# Patient Record
Sex: FEMALE | Race: BLACK OR AFRICAN AMERICAN | NOT HISPANIC OR LATINO | Employment: OTHER | URBAN - METROPOLITAN AREA
[De-identification: names, ages, dates, MRNs, and addresses within clinical notes are randomized per-mention and may not be internally consistent; named-entity substitution may affect disease eponyms.]

---

## 2017-04-20 ENCOUNTER — HOSPITAL ENCOUNTER (OUTPATIENT)
Dept: SLEEP MEDICINE | Facility: HOSPITAL | Age: 57
Discharge: HOME | End: 2017-04-20
Attending: INTERNAL MEDICINE
Payer: MEDICARE

## 2017-04-20 PROCEDURE — 95810 POLYSOM 6/> YRS 4/> PARAM: CPT

## 2017-07-20 ENCOUNTER — HOSPITAL ENCOUNTER (OUTPATIENT)
Dept: SLEEP MEDICINE | Facility: HOSPITAL | Age: 57
Discharge: HOME | End: 2017-07-20
Attending: INTERNAL MEDICINE
Payer: MEDICARE

## 2017-07-20 DIAGNOSIS — G47.33 OBSTRUCTIVE SLEEP APNEA (ADULT) (PEDIATRIC): ICD-10-CM

## 2017-07-20 PROCEDURE — 95811 POLYSOM 6/>YRS CPAP 4/> PARM: CPT

## 2018-06-21 PROBLEM — G47.33 OSA ON CPAP: Status: ACTIVE | Noted: 2018-06-21

## 2018-06-21 PROBLEM — I50.30 (HFPEF) HEART FAILURE WITH PRESERVED EJECTION FRACTION: Status: ACTIVE | Noted: 2018-06-21

## 2018-06-21 PROBLEM — M21.371 BILATERAL FOOT-DROP: Status: ACTIVE | Noted: 2018-06-21

## 2018-06-21 PROBLEM — E11.9 TYPE 2 DIABETES MELLITUS WITHOUT COMPLICATION, WITHOUT LONG-TERM CURRENT USE OF INSULIN: Status: ACTIVE | Noted: 2018-06-21

## 2018-06-21 PROBLEM — E66.01 MORBID OBESITY: Status: ACTIVE | Noted: 2018-06-21

## 2018-06-21 PROBLEM — I10 ESSENTIAL HYPERTENSION: Status: ACTIVE | Noted: 2018-06-21

## 2018-06-21 PROBLEM — R53.81 PHYSICAL DECONDITIONING: Status: ACTIVE | Noted: 2018-06-21

## 2018-06-21 PROBLEM — M21.372 BILATERAL FOOT-DROP: Status: ACTIVE | Noted: 2018-06-21

## 2018-12-28 PROBLEM — R06.09 DYSPNEA ON EXERTION: Status: ACTIVE | Noted: 2018-12-28

## 2018-12-30 PROBLEM — I27.20 PULMONARY HYPERTENSION: Status: ACTIVE | Noted: 2018-12-30

## 2019-02-25 ENCOUNTER — OFFICE VISIT (OUTPATIENT)
Dept: URGENT CARE | Facility: CLINIC | Age: 59
End: 2019-02-25
Payer: MEDICARE

## 2019-02-25 VITALS
OXYGEN SATURATION: 98 % | DIASTOLIC BLOOD PRESSURE: 70 MMHG | HEIGHT: 64 IN | SYSTOLIC BLOOD PRESSURE: 157 MMHG | BODY MASS INDEX: 43.36 KG/M2 | TEMPERATURE: 99 F | WEIGHT: 254 LBS | HEART RATE: 102 BPM | RESPIRATION RATE: 20 BRPM

## 2019-02-25 DIAGNOSIS — S81.812A LACERATION OF LEFT LOWER EXTREMITY, INITIAL ENCOUNTER: Primary | ICD-10-CM

## 2019-02-25 PROCEDURE — 99203 PR OFFICE/OUTPT VISIT, NEW, LEVL III, 30-44 MIN: ICD-10-PCS | Mod: S$GLB,,, | Performed by: FAMILY MEDICINE

## 2019-02-25 PROCEDURE — 99203 OFFICE O/P NEW LOW 30 MIN: CPT | Mod: S$GLB,,, | Performed by: FAMILY MEDICINE

## 2019-02-25 RX ORDER — CEPHALEXIN 250 MG/1
250 CAPSULE ORAL 4 TIMES DAILY
Qty: 40 CAPSULE | Refills: 0 | Status: SHIPPED | OUTPATIENT
Start: 2019-02-25 | End: 2019-03-07

## 2019-02-25 RX ORDER — MUPIROCIN 20 MG/G
OINTMENT TOPICAL 2 TIMES DAILY
Qty: 30 G | Refills: 0 | Status: SHIPPED | OUTPATIENT
Start: 2019-02-25 | End: 2019-07-11

## 2019-02-25 NOTE — PATIENT INSTRUCTIONS
Please drink plenty of fluids.  Please get plenty of rest.  Please return here or go to the Emergency Department for any concerns or worsening of condition.  If you were prescribed antibiotics, please take them to completion.  If you were prescribed a narcotic medication, do not drive or operate heavy equipment or machinery while taking these medications.      If not allergic, please take over the counter Tylenol (Acetaminophen) and/or Motrin (Ibuprofen) as directed for control of pain and/or fever.    Your tetanus was brought up to date if needed.    Keep wound clean and dry.  You may use a plastic bag to keep area dry while showering    Clean wound once or twice a day with soap and water, then apply antibiotic ointment and redress wound.        Please follow up with your primary care doctor or specialist as needed.  Preston Montesinos MD  [phone]      Laceration: All Closures  A laceration is a cut through the skin. This will usually require stitches (sutures) or staples if it is deep. Minor cuts may be treated with a surgical tape closure or skin glue.    Home care  · Your healthcare provider may prescribe an antibiotic. This is to help prevent infection. Follow all instructions for taking this medicine. Take the medicine every day until it is gone or you are told to stop. You should not have any left over.  · The healthcare provider may prescribe medicines for pain. Follow instructions for taking them.  · Follow the healthcare providers instructions on how to care for the cut.  · Keep the wound clean and dry. Do not get the wound wet until you are told it is okay to do so. If the area gets wet, gently pat it dry with a clean cloth. Replace the wet bandage with a dry one.  · If a bandage was applied and it becomes wet or dirty, replace it. Otherwise, leave it in place for the first 24 hours.  · Caring for sutures or staples: Once you no longer need to keep them dry, clean the wound daily. First, remove the  bandage. Then wash the area gently with soap and warm water, or as directed by the health care provider. Use a wet cotton swab to loosen and remove any blood or crust that forms. After cleaning, apply a thin layer of antibiotic ointment if advised. Then put on a new bandage unless you are told not to.  · Caring for skin glue: Dont put apply liquid, ointment, or cream on the wound while the glue is in place. Avoid activities that cause heavy sweating. Protect the wound from sunlight. Do not scratch, rub, or pick at the adhesive film. Do not place tape directly over the film. The glue should peel off within 5 to 10 days.   · Caring for surgical tape: Keep the area dry. If it gets wet, blot it dry with a clean towel. Surgical tape usually falls off within 7 to 10 days. If it has not fallen off after 10 days, you can take it off yourself. Put mineral oil or petroleum jelly on a cotton ball and gently rub the tape until it is removed.  · Once you can get the wound wet, you may shower as usual but do not soak the wound in water (no tub baths or swimming)  · Even with proper treatment, a wound infection may sometimes occur. Check the wound daily for signs of infection listed below.  Scalp wounds  During the first two days, you may carefully rinse your hair in the shower to remove blood, glass or dirt particles. After two days, you may shower and shampoo your hair normally. Do not soak your scalp in the tub or go swimming until the stitches or staples have been removed. Talk with your healthcare provider before applying any antibiotic ointment to the wound.  Mouth wounds  Eat soft foods to reduce pain. If the cut is inside of your mouth, clean by rinsing after each meal and at bedtime with a mixture of equal parts water and hydrogen peroxide (do not swallow!). Or, you can use a cotton swab to directly apply hydrogen peroxide onto the cut. Mouth wounds can be painful when eating. You may use an over-the-counter local  numbing solution for pain relief. If this is not available, you may use any numbing solution intended for teething babies. You may apply this directly to the sores with a cotton-tip swab or with your finger.  Follow-up care  Follow up with your healthcare provider as advised. Ask your healthcare provider how long sutures should be left in place. Be sure to return for suture removal as directed. If dissolving stitches were used in the mouth, these should fall out or dissolve without the need for removal. If tape closures were used, remove them yourself when your provider recommends if they have not fallen off on their own. If skin glue was used, the film will wear off by itself.  When to seek medical advice  Call your healthcare provider right away if any of these occur:  · Wound bleeding not controlled by direct pressure  · Signs of infection, including increasing pain in the wound, increasing wound redness or swelling, or pus or bad odor coming from the wound  · Fever of 100.4°F (38.ºC) or higher or as directed by your healthcare provider  · Stitches or staples come apart or fall out or surgical tape falls off before 7 days  · Wound edges re-open  · Wound changes colors  · Numbness around the wound   · Decreased movement around the injured area  Date Last Reviewed: 6/14/2015  © 9394-1908 [employer]. [address] [place] [address], [place] [address]. All rights reserved. This information is not intended as a substitute for professional medical care. Always follow your healthcare professional's instructions.

## 2019-02-25 NOTE — PROGRESS NOTES
"Subjective:       Patient ID: Veronica Samuel is a 59 y.o. female.    Vitals:  height is 5' 4" (1.626 m) and weight is 115.2 kg (254 lb). Her oral temperature is 98.5 °F (36.9 °C). Her blood pressure is 157/70 (abnormal) and her pulse is 102. Her respiration is 20 and oxygen saturation is 98%.     Chief Complaint: Laceration    Laceration    The incident occurred 2 days ago. The laceration is located on the left leg. The laceration is 2 cm in size. The laceration mechanism was a blunt object and metal edge. The patient is experiencing no pain. It is unknown if a foreign body is present. Her tetanus status is out of date.       Constitution: Negative for fatigue.   HENT: Negative for facial swelling and facial trauma.    Neck: Negative for neck stiffness.   Cardiovascular: Negative for chest trauma.   Eyes: Negative for eye trauma, double vision and blurred vision.   Gastrointestinal: Negative for abdominal trauma, abdominal pain and rectal bleeding.   Genitourinary: Negative for hematuria, missed menses, genital trauma and pelvic pain.   Musculoskeletal: Negative for pain, trauma, joint swelling and abnormal ROM of joint.   Skin: Positive for laceration. Negative for color change, wound, abrasion and bruising.   Neurological: Negative for dizziness, history of vertigo, light-headedness, coordination disturbances, altered mental status and loss of consciousness.   Hematologic/Lymphatic: Negative for history of bleeding disorder.   Psychiatric/Behavioral: Negative for altered mental status.       Objective:      Physical Exam   Constitutional: She is oriented to person, place, and time. She appears well-developed and well-nourished. She is cooperative.  Non-toxic appearance. She does not appear ill. No distress.   HENT:   Head: Normocephalic and atraumatic. Head is without abrasion, without contusion and without laceration.   Right Ear: Hearing, tympanic membrane, external ear and ear canal normal. No hemotympanum.   Left " Ear: Hearing, tympanic membrane, external ear and ear canal normal. No hemotympanum.   Nose: Nose normal. No mucosal edema, rhinorrhea or nasal deformity. No epistaxis. Right sinus exhibits no maxillary sinus tenderness and no frontal sinus tenderness. Left sinus exhibits no maxillary sinus tenderness and no frontal sinus tenderness.   Mouth/Throat: Uvula is midline, oropharynx is clear and moist and mucous membranes are normal. No trismus in the jaw. Normal dentition. No uvula swelling. No posterior oropharyngeal erythema.   Eyes: Conjunctivae, EOM and lids are normal. Pupils are equal, round, and reactive to light. Right eye exhibits no discharge. Left eye exhibits no discharge. No scleral icterus.   Sclera clear bilat   Neck: Trachea normal, normal range of motion, full passive range of motion without pain and phonation normal. Neck supple. No spinous process tenderness and no muscular tenderness present. No neck rigidity. No tracheal deviation present.   Cardiovascular: Normal rate, regular rhythm, normal heart sounds, intact distal pulses and normal pulses.   Pulmonary/Chest: Effort normal and breath sounds normal. No respiratory distress.   Abdominal: Soft. Normal appearance and bowel sounds are normal. She exhibits no distension, no pulsatile midline mass and no mass. There is no tenderness.   Musculoskeletal: Normal range of motion. She exhibits no edema or deformity.        Left lower leg: She exhibits laceration.        Legs:  Neurological: She is alert and oriented to person, place, and time. She has normal strength. No cranial nerve deficit or sensory deficit. She exhibits normal muscle tone. She displays no seizure activity. Coordination normal. GCS eye subscore is 4. GCS verbal subscore is 5. GCS motor subscore is 6.   Skin: Skin is warm, dry and intact. Capillary refill takes less than 2 seconds. No abrasion, no bruising, no burn, no ecchymosis and no laceration noted. She is not diaphoretic. No  pallor.   Psychiatric: She has a normal mood and affect. Her speech is normal and behavior is normal. Judgment and thought content normal. Cognition and memory are normal.   Nursing note and vitals reviewed.      Assessment:       1. Laceration of left lower extremity, initial encounter        Plan:     Wound dressed and cleaned in office.    Laceration of left lower extremity, initial encounter  -     cephALEXin (KEFLEX) 250 MG capsule; Take 1 capsule (250 mg total) by mouth 4 (four) times daily. for 10 days  Dispense: 40 capsule; Refill: 0  -     mupirocin (BACTROBAN) 2 % ointment; Apply topically 2 (two) times daily.  Dispense: 30 g; Refill: 0  -     diptheria-tetanus toxoids 2-2 Lf unit/0.5 mL injection 0.5 mL    Please drink plenty of fluids.  Please get plenty of rest.  Please return here or go to the Emergency Department for any concerns or worsening of condition.  If you were prescribed antibiotics, please take them to completion.  If you were prescribed a narcotic medication, do not drive or operate heavy equipment or machinery while taking these medications.      If not allergic, please take over the counter Tylenol (Acetaminophen) and/or Motrin (Ibuprofen) as directed for control of pain and/or fever.    Your tetanus was brought up to date if needed.    Keep wound clean and dry.  You may use a plastic bag to keep area dry while showering    Clean wound once or twice a day with soap and water, then apply antibiotic ointment and redress wound.      Please follow up with your primary care doctor or specialist as needed.  Preston Montesinos MD  [phone]

## 2019-06-14 PROBLEM — Z86.010 HISTORY OF COLON POLYPS: Status: ACTIVE | Noted: 2019-06-14

## 2019-06-14 PROBLEM — Z86.0100 HISTORY OF COLON POLYPS: Status: ACTIVE | Noted: 2019-06-14

## 2019-07-11 PROBLEM — R11.2 INTRACTABLE VOMITING WITH NAUSEA: Status: ACTIVE | Noted: 2019-07-11

## 2019-07-12 PROBLEM — I16.0 HYPERTENSIVE URGENCY: Status: ACTIVE | Noted: 2019-07-12

## 2019-07-12 PROBLEM — K31.84 GASTROPARESIS: Status: ACTIVE | Noted: 2019-07-12

## 2019-07-12 PROBLEM — Z91.199 MEDICALLY NONCOMPLIANT: Status: ACTIVE | Noted: 2019-07-12

## 2019-07-19 PROBLEM — G45.9 TIA (TRANSIENT ISCHEMIC ATTACK): Status: ACTIVE | Noted: 2019-07-19

## 2019-09-10 PROBLEM — I27.20 PULMONARY HTN: Status: RESOLVED | Noted: 2018-12-30 | Resolved: 2019-09-10

## 2019-09-10 PROBLEM — R94.2 DECREASED DIFFUSION CAPACITY OF LUNG: Status: ACTIVE | Noted: 2019-09-10

## 2019-11-25 PROBLEM — R21 RASH: Status: ACTIVE | Noted: 2019-11-25

## 2019-11-25 PROBLEM — F32.A DEPRESSION: Status: ACTIVE | Noted: 2019-11-25

## 2020-11-30 PROBLEM — D17.1 LIPOMA OF TORSO: Status: ACTIVE | Noted: 2020-11-30

## 2021-01-13 PROBLEM — I67.4 HYPERTENSIVE ENCEPHALOPATHY: Status: ACTIVE | Noted: 2021-01-13

## 2021-01-13 PROBLEM — R41.82 ALTERED MENTAL STATUS: Status: ACTIVE | Noted: 2021-01-13

## 2021-03-26 PROBLEM — N17.9 AKI (ACUTE KIDNEY INJURY): Status: ACTIVE | Noted: 2021-03-26

## 2021-03-27 PROBLEM — N39.0 ACUTE UTI: Status: ACTIVE | Noted: 2021-03-27

## 2023-01-18 PROBLEM — G47.10 HYPERSOMNOLENCE: Status: ACTIVE | Noted: 2023-01-18

## 2023-02-03 ENCOUNTER — TELEPHONE (OUTPATIENT)
Dept: HEPATOLOGY | Facility: CLINIC | Age: 63
End: 2023-02-03
Payer: MEDICARE

## 2023-02-03 NOTE — TELEPHONE ENCOUNTER
Dr. Preston Montesinos ordered that patient be scheduled for a hepatology consult visit.  Patient hep c antibody positive.  No quant found.  Attempt made to reach patient for scheduling.  LVM and sent letter asking that patient call for scheduling.

## 2023-02-13 ENCOUNTER — TELEPHONE (OUTPATIENT)
Dept: HEPATOLOGY | Facility: CLINIC | Age: 63
End: 2023-02-13
Payer: MEDICARE

## 2023-02-13 NOTE — TELEPHONE ENCOUNTER
Patient needs to setup consult visit with PA Scheuermann.  Attempt made to reach her again to setup visit; unable to LVM.

## 2023-02-13 NOTE — TELEPHONE ENCOUNTER
----- Message from Heena Jean-Baptiste sent at 2/13/2023  1:52 PM CST -----  Regarding: schedule next week  Patient calling to schedule appt  States she wants to have it next week as she will be going out of town  No dates come up for me to schedule her next week  Referral on file, she missed prior call to schedule    Patient can be contacted @# [phone] (home)

## 2023-02-14 ENCOUNTER — TELEPHONE (OUTPATIENT)
Dept: HEPATOLOGY | Facility: CLINIC | Age: 63
End: 2023-02-14
Payer: MEDICARE

## 2023-02-14 NOTE — TELEPHONE ENCOUNTER
I spoke with patient who was ordered by Dr. Preston Montesinos to f/u with hepatology.  She asked that I contact her daughter Kya (#[phone]) to discuss setting up a virtual visit for her with PA Scheuermann.  Attempt made to reach Kya; TEREZA asking that she call hepatology.

## 2023-02-14 NOTE — TELEPHONE ENCOUNTER
----- Message from Heena Jean-Baptiste sent at 2/14/2023  3:23 PM CST -----  Regarding: call to schedule  The patient called requesting to schedule w/Dr. Scheuermann  Request call back at your convenience    No further information provided      Patient can be contacted @# [phone] (home)

## 2023-03-17 ENCOUNTER — PATIENT MESSAGE (OUTPATIENT)
Dept: HEPATOLOGY | Facility: CLINIC | Age: 63
End: 2023-03-17
Payer: MEDICARE

## 2023-03-17 ENCOUNTER — TELEPHONE (OUTPATIENT)
Dept: HEPATOLOGY | Facility: CLINIC | Age: 63
End: 2023-03-17
Payer: MEDICARE

## 2023-03-17 ENCOUNTER — OFFICE VISIT (OUTPATIENT)
Dept: HEPATOLOGY | Facility: CLINIC | Age: 63
End: 2023-03-17
Payer: MEDICARE

## 2023-03-17 DIAGNOSIS — R76.8 HEPATITIS C ANTIBODY TEST POSITIVE: Primary | ICD-10-CM

## 2023-03-17 DIAGNOSIS — R74.8 ABNORMAL TRANSAMINASES: ICD-10-CM

## 2023-03-17 PROCEDURE — 99204 PR OFFICE/OUTPT VISIT, NEW, LEVL IV, 45-59 MIN: ICD-10-PCS | Mod: 95,,, | Performed by: PHYSICIAN ASSISTANT

## 2023-03-17 PROCEDURE — 99204 OFFICE O/P NEW MOD 45 MIN: CPT | Mod: 95,,, | Performed by: PHYSICIAN ASSISTANT

## 2023-03-17 RX ORDER — LACTULOSE 10 G/15ML
20 SOLUTION ORAL 2 TIMES DAILY
Qty: 1800 ML | Refills: 1 | Status: ON HOLD | OUTPATIENT
Start: 2023-03-17 | End: 2023-09-10 | Stop reason: HOSPADM

## 2023-03-17 NOTE — TELEPHONE ENCOUNTER
Attempt made to reach patient for lab scheduling.  LVM on all numbers listed asking that patient call and sent Donal message.

## 2023-03-17 NOTE — PROGRESS NOTES
HEPATOLOGY VIDEO VISIT NOTE - HCV clinic  The patient location is: home  Visit type: audiovisual    Each patient to whom he or she provides medical services by telemedicine is:  (1) informed of the relationship between the physician and patient and the respective role of any other health care provider with respect to management of the patient; and (2) notified that he or she may decline to receive medical services by telemedicine and may withdraw from such care at any time.      REFERRING PROVIDER: Preston Montesinos MD  CHIEF COMPLAINT: Hepatitis C   (accompanied by: daughter Kya)    HISTORY       This is a 63 y.o. Black or  female referred for HCV. Pt, daughter confused at start of visit b/c they were under impression she had been referred for HCC. No evidence of this as recent imaging w/o liver lesions & AFP normal    Per Epic:  (+) HCVAB - 1/2023  Has not had HCV RNA  (+) HCV risk - blood transfusion 1980s    Hx of abnl liver panel w/concern for advanced fibrosis: AST>ALT, intermittent TBili elevation in 1s or up to 2.5 once.  Also has period of marked transaminase elevation in 2021 (AST 500s, ALT 300s) w/ unclear etiology    U/S 1/2023 and CT w/ contrast - coarse hepatic echotexture, normal sized spleen. No ascites.    No formal liver staging    Reports decreased memory, forgetting things, mild confusion  Denies jaundice, dark urine, abdominal distention, hematemesis, melena.        PMH, PSH, SOCIAL HX, FAMILY HX      Reviewed in Epic  Pertinent findings:  FAMILY HX: neg for liver diease  SOCIAL HX: resides in [place], daughter involved in care resides in [place]  Alcohol - no heavy use  Drugs - no      ROS: as per HPI, in addition:  Intermittent Right sided pain  (+) joint pain  (+) neuropathy in feet  No rashes      PHYSICAL EXAM:  Friendly Black or  female, in no acute distress; alert and oriented to person, place and time  LUNGS: Normal respiratory effort.  NEURO/PSYCH:  Memory intact. Thought and speech pattern appropriate. Behavior normal. No depression or anxiety noted.      PERTINENT DIAGNOSTIC RESULTS      Lab Results   Component Value Date    WBC 9.20 01/18/2023    HGB 12.3 01/18/2023     01/18/2023     Lab Results   Component Value Date    INR 1.0 07/19/2019     Lab Results   Component Value Date    AST 83 (H) 01/18/2023    ALT 37 01/18/2023    BILITOT 1.2 01/18/2023    ALBUMIN 3.4 (L) 01/18/2023    ALKPHOS 151 (H) 01/18/2023    CREATININE 1.17 01/18/2023    BUN 21 (H) 01/18/2023     (L) 01/18/2023    K 3.3 (L) 01/18/2023    AFP 4.6 02/09/2023         ASSESSMENT        63 y.o. Black or  female with:  POSITIVE HEPATITIS C ANTIBODY  (+) risks, abnl liver panel - r/o Chronic HCV    2.   ABNL LIVER PANEL, CONCERN FOR ADVANCED LIVER FIBROSIS    3.    DECREASED MEMORY, MILD CONFUSION - HE?    PLAN     Labs soon  Lactulose trial  F/u either in HCV clinic for HCV mngmt or General hepatology for eval of abnl liver studies will be based on whether she is viremic     Orders Placed This Encounter   Procedures    Hepatitis C Genotype    HCV Fibrosure    Hepatitis B Surface Ab, Qualitative    Hepatitis B Core Antibody, Total    Hepatitis A antibody, IgG    Comprehensive Metabolic Panel    Protime-INR    Phosphatidylethanol (PETH)       ____________________________________________________________________________  EDUCATION:  Discussed significance of HCV antibody, indicating prior exposure to HCV. Discussed need for additional labs to see if pt has cleared virus on own or if still viremic, in which case further evaluation and antiviral therapy would be needed.     Transmission of Hepatitis C was reviewed, including possible sexual  & vertical transmission. Patient should avoid sharing personal products such as razors, toothbrushes, etc.     Recommend avoiding raw seafood.  Limit acetaminophen to 2000mg  daily.  __________________________________________________________________    Duration of encounter: 51 min  This includes face-to-face time and non face-to-face time preparing to see the patient (eg, review of tests), obtaining and/or reviewing separately obtained history, documenting clinical information in the electronic or other health record, independently interpreting resultsand communicating results to the patient/family/caregiver, or care coordination.

## 2023-03-17 NOTE — TELEPHONE ENCOUNTER
----- Message from Jennifer B. Scheuermann, PA sent at 3/17/2023  9:01 AM CDT -----  [facility] schedule labs. thanks

## 2023-03-31 ENCOUNTER — PATIENT MESSAGE (OUTPATIENT)
Dept: HEPATOLOGY | Facility: CLINIC | Age: 63
End: 2023-03-31
Payer: MEDICARE

## 2023-04-03 ENCOUNTER — TELEPHONE (OUTPATIENT)
Dept: HEPATOLOGY | Facility: CLINIC | Age: 63
End: 2023-04-03
Payer: MEDICARE

## 2023-04-03 DIAGNOSIS — R76.8 HEPATITIS C ANTIBODY TEST POSITIVE: Primary | ICD-10-CM

## 2023-04-03 NOTE — TELEPHONE ENCOUNTER
Labs reviewed  Seth 1b  (+) HAVAB  Neg HBsAb, HBsAg, HBcAb  FibroSURE A0-1, F3-4  CMP and INR stable    Peth pending    Mayra [facility] schedule f/u visit  Also, needs HCV RNA (I didn't realize recent seth would not include that). If she happens to have upcoming labs it can be added. Or can be set up after I see her.

## 2023-04-03 NOTE — TELEPHONE ENCOUNTER
I spoke with patient and Crystal.  Lab draw scheduled 4/5/23 and f/u virtual visit scheduled 4/20/23.

## 2023-04-03 NOTE — TELEPHONE ENCOUNTER
I spoke with patient and her daughter Mayra.  Lab draw scheduled 4/5 and f/u with scheduled 4/20/23.

## 2023-04-20 ENCOUNTER — OFFICE VISIT (OUTPATIENT)
Dept: HEPATOLOGY | Facility: CLINIC | Age: 63
End: 2023-04-20
Payer: MEDICARE

## 2023-04-20 ENCOUNTER — PATIENT MESSAGE (OUTPATIENT)
Dept: HEPATOLOGY | Facility: CLINIC | Age: 63
End: 2023-04-20

## 2023-04-20 DIAGNOSIS — B18.2 CHRONIC HEPATITIS C WITHOUT HEPATIC COMA: Primary | ICD-10-CM

## 2023-04-20 DIAGNOSIS — K76.82 HEPATIC ENCEPHALOPATHY: ICD-10-CM

## 2023-04-20 DIAGNOSIS — K74.60 HEPATIC CIRRHOSIS, UNSPECIFIED HEPATIC CIRRHOSIS TYPE, UNSPECIFIED WHETHER ASCITES PRESENT: ICD-10-CM

## 2023-04-20 DIAGNOSIS — K21.9 GASTROESOPHAGEAL REFLUX DISEASE, UNSPECIFIED WHETHER ESOPHAGITIS PRESENT: ICD-10-CM

## 2023-04-20 PROCEDURE — 99215 PR OFFICE/OUTPT VISIT, EST, LEVL V, 40-54 MIN: ICD-10-PCS | Mod: 95,,, | Performed by: PHYSICIAN ASSISTANT

## 2023-04-20 PROCEDURE — 99215 OFFICE O/P EST HI 40 MIN: CPT | Mod: 95,,, | Performed by: PHYSICIAN ASSISTANT

## 2023-04-20 RX ORDER — PANTOPRAZOLE SODIUM 20 MG/1
20 TABLET, DELAYED RELEASE ORAL DAILY
Qty: 30 TABLET | Refills: 6 | Status: SHIPPED | OUTPATIENT
Start: 2023-04-20 | End: 2024-04-19

## 2023-04-20 RX ORDER — VELPATASVIR AND SOFOSBUVIR 100; 400 MG/1; MG/1
1 TABLET, FILM COATED ORAL DAILY
Qty: 28 TABLET | Refills: 5 | Status: ON HOLD | OUTPATIENT
Start: 2023-04-20 | End: 2023-11-16

## 2023-04-20 NOTE — PROGRESS NOTES
HEPATOLOGY VIDEO VISIT NOTE - HCV clinic  The patient location is: home  Visit type: audiovisual  Converted to audio due to technical issues    Each patient to whom he or she provides medical services by telemedicine is:  (1) informed of the relationship between the physician and patient and the respective role of any other health care provider with respect to management of the patient; and (2) notified that he or she may decline to receive medical services by telemedicine and may withdraw from such care at any time.    CHIEF COMPLAINT: Hepatitis C   (accompanied by: daughter Kya)    HISTORY       This is a 63 y.o. Black or  female being seen for f/u    Interval history:  Labs confirmed HCV infection w/ advanced fibrosis  Peth 85  Lacking HBV immunity  (+) HAV immunity    Pt tried lactulose after last visit for possible HE sx of mild confusion, forgetfulness   - lactulose worked well for sx but caused diarrhea so didn't take it daily    Current symptoms of hepatic decompensation:  Ascites / EVETTE - no  Diuretic use - on lasix 20mg daily  TBili elevation - no  HE / confusion / memory problems - yes, see above  EV bleed / hematemesis melena - no    Of note, takes protonix 40mg daily for GERD  Occasional breakthrough sx based on diet    HCV History:  Recently diagnosed  (+) HCV risk - blood transfusion 1980s  Cee 1b  Treatment naive    Liver staging:  FibroSURE 3/2023 - A0-1, F3-4  Labs sx support staging    Cirrhosis history:  Mildly decompensated but stable: Mild HE, intermittent TBili elevation  No evidence portal HTN: spleen normal, plt normal    MELD-Na score: 8 at 3/24/2023 11:41 AM  MELD score: 8 at 3/24/2023 11:41 AM  Calculated from:  Serum Creatinine: 1.19 mg/dL at 3/24/2023 11:41 AM  Serum Sodium: 137 mmol/L at 3/24/2023 11:41 AM  Total Bilirubin: 0.8 mg/dL (Using min of 1 mg/dL) at 3/24/2023 11:41 AM  INR(ratio): 1.0 at 3/24/2023 11:41 AM  Age: 63 years    Cirrhosis maintenance:  HCC  screening - U/S w/o liver lesions (1/2023);  AFP 4.6 (2/2023)  Varices screening - EGD needed  HAV immunity - documented (+) HAVAB   HBV immunity - lacking      PMH, PSH, SOCIAL HX, FAMILY HX      Reviewed in Epic  Pertinent findings:  FAMILY HX: neg for liver diease  SOCIAL HX: resides in [place], daughter involved in care resides in [place]  Alcohol - no heavy use  Drugs - no      ROS: as per HPI    PHYSICAL EXAM:  Friendly Black or  female, in no acute distress; alert and oriented to person, place and time  LUNGS: Normal respiratory effort.  NEURO/PSYCH: Memory intact. Thought and speech pattern appropriate. Behavior normal. No depression or anxiety noted.      PERTINENT DIAGNOSTIC RESULTS      Lab Results   Component Value Date    WBC 9.20 01/18/2023    HGB 12.3 01/18/2023     01/18/2023     Lab Results   Component Value Date    INR 1.0 03/24/2023     Lab Results   Component Value Date    AST 66 (H) 03/24/2023    ALT 45 (H) 03/24/2023    BILITOT 0.8 03/24/2023    ALBUMIN 3.9 03/24/2023    ALKPHOS 115 03/24/2023    CREATININE 1.19 03/24/2023    BUN 20 (H) 03/24/2023     03/24/2023    K 3.2 (L) 03/24/2023    AFP 4.6 02/09/2023     US ABDOMEN LIMITED_LIVER - 1/11/23     CLINICAL HISTORY:  Other specified abnormal findings of blood chemistry     FINDINGS:  The liver demonstrates a mildly coarse echotexture without focal lesion. Prior cholecystectomy.  The common duct measures 4 mm. The hepatic and portal veins are patent.  Portal vein velocity 24 centimeters/second     Impression:  Mildly coarse hepatic echotexture, possible steatosis or hepatocellular disease.    ASSESSMENT        63 y.o. Black or  female with:  CHRONIC HCV, GENOTYPE 1B - treatment naive  -- elevated transaminases    2.   NEWLY DIAGNOSED CIRRHOSIS, mildly decompensated  -- MELD 8  -- HCC screening - up to date 1/2023  -- HE - improvement w/ lactulose but needs dose titration  -- EV screening - needed    3.  GERD -- on protonix 40mg    PLAN     Continue lactulose - dose titration reviewed  Next HCC screening due 7/2023  Pt to reach out to Dr Brock regarding need for EGD for varices screening  Obtain authorization to treat HCV with Epclusa x 24 weeks  -- Rx will be routed to [facility]  -- Patient will notify me of exact treatment start date so appropriate lab f/u can be scheduled.  5.    Reduce protonix to 20mg, dosed as below, while on HCV rx  6.    Discuss HBV vaccine at next visit    Cc: Hans Brock MD    ______________________________________________________________________________  EDUCATION:  HCV RX  Discussed goal of HCV eradication to prevent progression of liver disease.  Discussed use of Epclusa daily x 24 weeks w/ potential side effects of fatigue and headache.     Reviewed limitations on acid suppressant medications due to DDI w/ Epclusa:  -- Antacids - not taking  -- H2 Receptor Antagonist - not taking  -- PPI - protonix, will reduce to 20mg once daily, dosed 4 hours after Epclusa is taken with food  Patient instructed to contact me if experiencing acid related symptoms so further recommendations can be made regarding acid suppression therapy.      Reviewed limitations on statin therapy and Epclusa:  -- Patient taking simvastatin. Can continue    Herbal / alternative therapies must be discontinued  Discussed importance of medication adherence and risk of treatment failure / viral resistance if not adherent. Pt has verbalized understanding.      CIRRHOSIS  Discussed evidence that indicates that pt has cirrhosis.   -- Discussed liver fibrosis/scarring and relation to liver function. Reviewed significance of MELD scoring system as it relates to indication for transplant.  -- Signs and symptoms of hepatic decompensation were reviewed, including jaundice, ascites, and slowed mentation due to hepatic encephalopathy. The patient should seek medical attention if any of these things occur.    --  We discussed the potential for bleeding from esophageal varices with symptoms of hematemesis and melena. The patient should report to the Emergency Department for these symptoms.   -- We discussed the increased risk of hepatocellular carcinoma due to cirrhosis. Lifelong screening every six months with ultrasound and AFP is recommended.   -- Discussed portal hypertension, including potential development of esophageal varices. Role of EGD in screening for varices was reviewed.     -- Tylenol (acetaminophen) is okay up to 2,000mg daily  -- Avoid NSAIDs     Dietary recommendations:  -- Avoid alcohol  -- Avoid raw seafood    __________________________________________________________________    Duration of encounter: 63 min  This includes face-to-face time and non face-to-face time preparing to see the patient (eg, review of tests), obtaining and/or reviewing separately obtained history, documenting clinical information in the electronic or other health record, independently interpreting resultsand communicating results to the patient/family/caregiver, or care coordination.

## 2023-04-21 ENCOUNTER — SPECIALTY PHARMACY (OUTPATIENT)
Dept: PHARMACY | Facility: CLINIC | Age: 63
End: 2023-04-21
Payer: MEDICARE

## 2023-04-21 NOTE — TELEPHONE ENCOUNTER
New Rx received for Epclusa x 24 weeks. PA required. Epclusa PA submitted via CM. Key: [id number] - PA Case ID: [id number]

## 2023-04-24 PROBLEM — N39.0 ACUTE UTI: Status: RESOLVED | Noted: 2021-03-27 | Resolved: 2023-04-24

## 2023-04-24 NOTE — TELEPHONE ENCOUNTER
Epclusa x 24 weeks PA approved from 4/21/2023 - 10/6/2023. Test claim: $0 copay. Pt has [employer] Medicare Part D. LIS level 1. No BI/FA necessary.

## 2023-04-25 ENCOUNTER — SPECIALTY PHARMACY (OUTPATIENT)
Dept: PHARMACY | Facility: CLINIC | Age: 63
End: 2023-04-25
Payer: MEDICARE

## 2023-04-25 DIAGNOSIS — B18.2 CHRONIC HEPATITIS C WITHOUT HEPATIC COMA: Primary | ICD-10-CM

## 2023-04-27 PROBLEM — B18.2 CHRONIC VIRAL HEPATITIS C: Status: ACTIVE | Noted: 2023-04-27

## 2023-04-27 NOTE — TELEPHONE ENCOUNTER
Specialty Pharmacy - Initial Clinical Assessment - AG Epclusa fill 1 of 6    Specialty Medication Orders Linked to Encounter      Flowsheet Row Most Recent Value   Medication #1 sofosbuvir-velpatasvir 400-100 mg Tab (Order#[id number], Rx#[id number])          Patient Diagnosis   B18.2 - Chronic viral hepatitis C    Subjective    Veronica Samuel is a 63 y.o. female, who is followed by the specialty pharmacy service for management and education.    Recent Encounters       Date Type Provider Description    04/25/2023 Specialty Pharmacy Yeni Thakur, Anabela Initial Clinical Assessment    04/21/2023 Specialty Pharmacy Yeni Thakur, PharmD Referral Authorization            Current Outpatient Medications   Medication Sig    blood-glucose meter (ACCU-CHEK ANNETTE PLUS METER MISC) Accu-Chek Annette Plus Meter   USE DEVICE TO CHECK GLUCOSE THREE TIMES DAILY    carvedilol (COREG) 6.25 MG tablet Take 1 tablet (6.25 mg total) by mouth 2 (two) times daily with meals.    ergocalciferol (ERGOCALCIFEROL) 50,000 unit Cap Take 50,000 Units by mouth every 7 days. sunday    FLUoxetine 10 MG Tab Take 10 mg by mouth once daily.    gabapentin (NEURONTIN) 600 MG tablet gabapentin 600 mg tablet   TK 1 T PO TID    glipiZIDE (GLUCOTROL) 10 MG tablet glipizide 10 mg tablet   TK 1 T PO BID    hydrocodone-acetaminophen 10-325mg (NORCO)  mg Tab Take 1 tablet by mouth 3 (three) times daily.    lactulose (CHRONULAC) 20 gram/30 mL Soln Take 30 mLs (20 g total) by mouth 2 (two) times daily. Goal of 3-5 soft stools each day    losartan-hydrochlorothiazide 100-25 mg (HYZAAR) 100-25 mg per tablet Take 1 tablet by mouth once daily.    metFORMIN (GLUCOPHAGE) 1000 MG tablet Take 1,000 mg by mouth 2 (two) times daily with meals.    nystatin (MYCOSTATIN) powder 2 (two) times daily as needed.     pantoprazole (PROTONIX) 20 MG tablet Take 1 tablet (20 mg total) by mouth once daily. Take 4 hours after epclusa is taken w/ food    simvastatin (ZOCOR) 40 MG  "tablet Take 40 mg by mouth every evening.    sofosbuvir-velpatasvir 400-100 mg Tab Take 1 tablet by mouth once daily.    TOUJEO SOLOSTAR U-300 INSULIN 300 unit/mL (1.5 mL) InPn pen inject 70 units subcutaneously once daily    traZODone (DESYREL) 100 MG tablet Take 100 mg by mouth every evening.    umeclidinium-vilanteroL (ANORO ELLIPTA) 62.5-25 mcg/actuation DsDv Inhale 1 puff into the lungs once daily. Controller    zolpidem (AMBIEN) 5 MG Tab Take 2 tablets (10 mg total) by mouth nightly as needed.    aspirin (ECOTRIN) 81 MG EC tablet Take 1 tablet (81 mg total) by mouth once daily.    BD ULTRA-FINE BEAR PEN NEEDLE 32 gauge x 5/32" Ndle USE 1 PEN NEEDLE SUBCUTANOUESLY QD    furosemide (LASIX) 20 MG tablet Take 1 tablet (20 mg total) by mouth once daily.    pantoprazole (PROTONIX) 40 MG tablet Take 1 tablet (40 mg total) by mouth once daily.   Last reviewed on 4/20/2023  2:52 PM by Jennifer B. Scheuermann, PA    Review of patient's allergies indicates:  No Known AllergiesLast reviewed on  4/20/2023 2:52 PM by Jennifer B Scheuermann    Drug Interactions    Drug interactions evaluated: yes  Clinically relevant drug interactions identified: yes   Interactions list: Epclusa/Protonix: Inhibitors of the Proton Pump (PPIs and PCABs) may decrease the serum concentration of Velpatasvir.      Drug management plan: Epclusa/Protonix: Pt will take Epclusa with food and Protonix 20 mg EXACTLY 4 hours later.               Assessment Questions - Documented Responses      Flowsheet Row Most Recent Value   Assessment    Medication Reconciliation completed for patient Yes   During the past 4 weeks, has patient missed any activities due to condition or medication? No   During the past 4 weeks, did patient have any of the following urgent care visits? None   Goals of Therapy Status Discussed (new start)   Status of the patients ability to self-administer: Is Able   All education points have been covered with patient? Yes, supplemental " "printed education provided   Welcome packet contents reviewed and discussed with patient? Yes   Assesment completed? Yes   Plan Therapy being initiated   Do you need to open a clinical intervention (i-vent)? No   Do you want to schedule first shipment? Yes   Medication #1 Assessment Info    Patient status New medication, New to [facility]   Is this medication appropriate for the patient? Yes   Is this medication effective? Not yet started          Refill Questions - Documented Responses      Flowsheet Row Most Recent Value   Refill Screening Questions    When does the patient need to receive the medication? 05/02/23   Refill Delivery Questions    How will the patient receive the medication? MEDRx   When does the patient need to receive the medication? 05/02/23   Shipping Address Home   Address in [facility] confirmed and updated if neccessary? Yes   Expected Copay ($) 0   Is the patient able to afford the medication copay? Yes   Payment Method zero copay   Days supply of Refill 28   Supplies needed? No supplies needed   Refill activity completed? Yes   Refill activity plan Refill scheduled   Shipment/Pickup Date: 04/28/23            Objective    She has a past medical history of Anxiety, Chronic pain, Constipation, Depression, Diabetes mellitus, Diverticulitis, Diverticulosis, Encounter for blood transfusion, Falls, Foot drop, bilateral, Gastritis, GERD (gastroesophageal reflux disease), Hyperlipidemia, Hypertension, Insomnia, Internal and external hemorrhoids without complication, Lumbar radiculopathy, Neuropathy, and Sleep apnea.    Tried/failed medications: none    BP Readings from Last 4 Encounters:   01/18/23 (!) 163/79   12/24/22 (!) 171/86   03/28/21 (!) 108/52   01/14/21 124/60     Ht Readings from Last 4 Encounters:   01/18/23 5' 4" (1.626 m)   03/27/21 5' 4" (1.626 m)   01/13/21 5' 4" (1.626 m)   11/30/20 5' 4" (1.626 m)     Wt Readings from Last 4 Encounters:   01/18/23 98.8 kg (217 lb 13 oz)   12/24/22 " 90.4 kg (199 lb 3.2 oz)   03/27/21 116.5 kg (256 lb 13.4 oz)   01/13/21 119.5 kg (263 lb 7.2 oz)     No results found for: HCVGENOTYPE  Recent Labs   Lab Result Units 04/10/23  1110 03/24/23  1142 03/24/23  1141   Creatinine mg/dL  --   --  1.19   ALT U/L  --   --  45 H   AST U/L  --   --  66 H   HCV RNA Quant PCR IU/mL [id number] H  --   --    Hep B S Ab mIU/mL  --  5.0  --    Hep B Core Total Ab   --   --  Non-reactive     The goals of Hepatitis C Virus (HCV) infection treatment include:  Reducing all-cause mortality and liver-related health adverse consequences, including cirrhosis, end-stage liver disease, and hepatocellular carcinoma  Achieving virologic cure as evidenced by a sustained virologic response  Improving or maintaining quality of life  Maintaining optimal therapy adherence  Minimizing and managing side effects  Preventing the transmission of HCV      Goals of Therapy Status: Discussed (new start)    Assessment/Plan  Patient plans to start therapy on 05/02/23      Indication, dosage, appropriateness, effectiveness, safety and convenience of her specialty medication(s) were reviewed today.     Patient Education   Patient received education on the following:   Expectations and possible outcomes of therapy  Proper use, timely administration, and missed dose management  Duration of therapy  Side effects, including prevention, minimization, and management  Contraindications and safety precautions  New or changed medications, including prescribe and over the counter medications and supplements  Reviews recommended vaccinations, as appropriate  Storage, safe handling, and disposal    Pt counseled extensively on appropriate administration of Epclusa and Protonix. She will take Epclusa with food and Protonix 20 mg exactly 4 hours later.     Tasks added this encounter   No tasks added.   Tasks due within next 3 months   4/28/2023 - Initial Clinical Assessment/Patient Education (1 Time Occurence)  4/24/2023 - Set up  Initial Fill     Yeni Thakur, PharmD  [facility]  [address]  [facility] [address]  Phone: [phone]  Fax: [phone]

## 2023-04-28 ENCOUNTER — TELEPHONE (OUTPATIENT)
Dept: HEPATOLOGY | Facility: CLINIC | Age: 63
End: 2023-04-28
Payer: MEDICARE

## 2023-04-28 DIAGNOSIS — B18.2 CHRONIC HEPATITIS C WITHOUT HEPATIC COMA: Primary | ICD-10-CM

## 2023-04-28 DIAGNOSIS — K74.60 HEPATIC CIRRHOSIS, UNSPECIFIED HEPATIC CIRRHOSIS TYPE, UNSPECIFIED WHETHER ASCITES PRESENT: ICD-10-CM

## 2023-04-28 NOTE — TELEPHONE ENCOUNTER
Pt beginning 24 weeks of Epclusa on 5/2/23  Anticipated treatment end date: 10/16/23  Cee 1b  Treatment naive  F4 decomp      Pls update episode and schedule:  - CBC, CMP, INR, AFP, HCV RNA, U/S - EARLY 7/2023  - VISIT July (after above testing)  - HCV RNA - SVR12 - 1/16/24

## 2023-05-01 DIAGNOSIS — K74.60 HEPATIC CIRRHOSIS, UNSPECIFIED HEPATIC CIRRHOSIS TYPE, UNSPECIFIED WHETHER ASCITES PRESENT: Primary | ICD-10-CM

## 2023-05-01 NOTE — TELEPHONE ENCOUNTER
Attempt made to reach patient unsuccessful.  Msg from provider mailed to her.  US and labs scheduled 7/5/23, f/u visit scheduled 7/13/23 and svr draw scheduled 1/16/24; appt reminder notices mailed.

## 2023-05-08 ENCOUNTER — SPECIALTY PHARMACY (OUTPATIENT)
Dept: PHARMACY | Facility: CLINIC | Age: 63
End: 2023-05-08
Payer: MEDICARE

## 2023-05-08 ENCOUNTER — PATIENT MESSAGE (OUTPATIENT)
Dept: HEPATOLOGY | Facility: CLINIC | Age: 63
End: 2023-05-08
Payer: MEDICARE

## 2023-05-08 NOTE — TELEPHONE ENCOUNTER
7 Day Touchbase - Documented Responses      Flowsheet Row Most Recent Value   Have you started taking your medication? Yes   What day did you start? 05/02/23   How are you feeling?  Spoke with patient's daugther (Kya) who states that she believes her mom is doing well since starting Epclusa. However, she will check in with the patient after work today and call back if the patient has any questions/concerns.   How are you taking your medication? Are you having any problems taking your medication? Pt is taking Epclusa - 1 tablet by mouth once daily.   Do you have any questions or concerns that we can help you with today? No further questions or concerns at this time.            Yeni Thakur, PharmD  [facility]  [address]  [facility] [address]  Phone: [phone]  Fax: [phone]

## 2023-05-22 ENCOUNTER — SPECIALTY PHARMACY (OUTPATIENT)
Dept: PHARMACY | Facility: CLINIC | Age: 63
End: 2023-05-22
Payer: MEDICARE

## 2023-05-22 NOTE — TELEPHONE ENCOUNTER
Specialty Pharmacy - Refill Coordination - AG Epclusa fill 2 of 6    Specialty Medication Orders Linked to Encounter      Flowsheet Row Most Recent Value   Medication #1 sofosbuvir-velpatasvir 400-100 mg Tab (Order#[id number], Rx#[id number])          AG Epclusa refill (2 of 6) confirmed and reassessment complete. Confirmed 2 patient identifiers - name and . Therapy appropriate.     Patient has 8 doses of Epclusa remaining and takes it around the same time daily. Spoke with patient's daughter, Kya, who states the pt reports they are not having any side effects so far. No missed doses, no new medications, no new allergies or health conditions reported at this time. Allergies reviewed and medication reconciliation complete (reviewed and documented in [facility]). Per patient's daughter, she has been taking the Protonix only as needed. Reviewed that if she is to take Protonix at all, she must take Epclusa with food and then Protonix exactly 4 hours later. Pt's daughter states that she will review this with the patient and ensure she is taking correctly. Disease education reviewed (including transmission and prevention). Patient counseled on importance of maintaining adherence and keeping lab appointments which were scheduled. All questions answered and addressed to patients satisfaction. Advised to call [facility] and provider if any issues arise.  Pt verbalized understanding.    Refill Questions - Documented Responses      Flowsheet Row Most Recent Value   Patient Availability and HIPAA Verification    Does patient want to proceed with activity? Yes   HIPAA/medical authority confirmed? Yes   Relationship to patient of person spoken to? Child   Refill Screening Questions    Changes to allergies? No   Changes to medications? No   New conditions since last clinic visit? No   Unplanned office visit, urgent care, ED, or hospital admission in the last 4 weeks? No   How does patient/caregiver feel  "medication is working? Too soon to tell   Financial problems or insurance changes? No   How many doses of your specialty medications were missed in the last 4 weeks? 0   Would patient like to speak to a pharmacist? No   When does the patient need to receive the medication? 05/30/23   Refill Delivery Questions    How will the patient receive the medication? MEDRx   When does the patient need to receive the medication? 05/30/23   Shipping Address Home   Address in [facility] confirmed and updated if neccessary? Yes   Expected Copay ($) 0   Is the patient able to afford the medication copay? Yes   Payment Method zero copay   Days supply of Refill 28   Supplies needed? No supplies needed   Refill activity completed? Yes   Refill activity plan Refill scheduled   Shipment/Pickup Date: 05/23/23            Current Outpatient Medications   Medication Sig    aspirin (ECOTRIN) 81 MG EC tablet Take 1 tablet (81 mg total) by mouth once daily.    BD ULTRA-FINE BEAR PEN NEEDLE 32 gauge x 5/32" Ndle USE 1 PEN NEEDLE SUBCUTANOUESLY QD    blood-glucose meter (ACCU-CHEK FRANK PLUS METER MISC) Accu-Chek Frank Plus Meter   USE DEVICE TO CHECK GLUCOSE THREE TIMES DAILY    carvedilol (COREG) 6.25 MG tablet Take 1 tablet (6.25 mg total) by mouth 2 (two) times daily with meals.    ergocalciferol (ERGOCALCIFEROL) 50,000 unit Cap Take 50,000 Units by mouth every 7 days. sunday    FLUoxetine 10 MG Tab Take 10 mg by mouth once daily.    furosemide (LASIX) 20 MG tablet Take 1 tablet (20 mg total) by mouth once daily.    gabapentin (NEURONTIN) 600 MG tablet gabapentin 600 mg tablet   TK 1 T PO TID    glipiZIDE (GLUCOTROL) 10 MG tablet glipizide 10 mg tablet   TK 1 T PO BID    hydrocodone-acetaminophen 10-325mg (NORCO)  mg Tab Take 1 tablet by mouth 3 (three) times daily.    lactulose (CHRONULAC) 20 gram/30 mL Soln Take 30 mLs (20 g total) by mouth 2 (two) times daily. Goal of 3-5 soft stools each day    losartan-hydrochlorothiazide " 100-25 mg (HYZAAR) 100-25 mg per tablet Take 1 tablet by mouth once daily.    metFORMIN (GLUCOPHAGE) 1000 MG tablet Take 1,000 mg by mouth 2 (two) times daily with meals.    nystatin (MYCOSTATIN) powder 2 (two) times daily as needed.     pantoprazole (PROTONIX) 20 MG tablet Take 1 tablet (20 mg total) by mouth once daily. Take 4 hours after epclusa is taken w/ food    pantoprazole (PROTONIX) 40 MG tablet Take 1 tablet (40 mg total) by mouth once daily.    simvastatin (ZOCOR) 40 MG tablet Take 40 mg by mouth every evening.    sofosbuvir-velpatasvir 400-100 mg Tab Take 1 tablet by mouth once daily.    TOUJEO SOLOSTAR U-300 INSULIN 300 unit/mL (1.5 mL) InPn pen inject 70 units subcutaneously once daily    traZODone (DESYREL) 100 MG tablet Take 100 mg by mouth every evening.    umeclidinium-vilanteroL (ANORO ELLIPTA) 62.5-25 mcg/actuation DsDv Inhale 1 puff into the lungs once daily. Controller    zolpidem (AMBIEN) 5 MG Tab Take 2 tablets (10 mg total) by mouth nightly as needed.   Last reviewed on 4/20/2023  2:52 PM by Jennifer B. Scheuermann, PA    Review of patient's allergies indicates:  No Known Allergies Last reviewed on  4/20/2023 2:52 PM by Jennifer B Scheuermann      Tasks added this encounter   No tasks added.   Tasks due within next 3 months   No tasks due.     Yeni Thakur, PharmD  [facility]  [address] [place] [address]  Phone: [phone]  Fax: [phone]

## 2023-05-25 NOTE — TELEPHONE ENCOUNTER
Spoke to pt 5/22  Unable to order home health to make sure pt taking oral tx daily  Daughter will continue working w/ pt to ensure adherence.

## 2023-06-23 ENCOUNTER — SPECIALTY PHARMACY (OUTPATIENT)
Dept: PHARMACY | Facility: CLINIC | Age: 63
End: 2023-06-23
Payer: MEDICARE

## 2023-06-23 NOTE — TELEPHONE ENCOUNTER
Specialty Pharmacy - Refill Coordination - AG Epclusa fill 3 of 6    Specialty Medication Orders Linked to Encounter      Flowsheet Row Most Recent Value   Medication #1 sofosbuvir-velpatasvir 400-100 mg Tab (Order#[id number], Rx#[id number])          AG Epclusa refill (3 of 6) confirmed and reassessment complete. Confirmed 2 patient identifiers - name and . Therapy appropriate.      Patient has 5-6 doses of Epclusa remaining and takes it around the same time daily. Pt reports no side effects so far. Pt did miss 2 doses due to a stomach virus and being unable to keep anything down. No new medications, no new allergies or health conditions reported at this time. Allergies reviewed and medication reconciliation complete (reviewed and documented in [facility]). Per patient's daughter, she has been taking the Protonix only as needed. Reviewed that if she is to take Protonix at all, she must take Epclusa with food and then Protonix exactly 4 hours later. Disease education reviewed (including transmission and prevention). Patient counseled on importance of maintaining adherence and keeping lab appointments which were scheduled. All questions answered and addressed to patients satisfaction. Advised to call [facility] and provider if any issues arise.  Pt verbalized understanding.       Refill Questions - Documented Responses      Flowsheet Row Most Recent Value   Patient Availability and HIPAA Verification    Does patient want to proceed with activity? Yes   HIPAA/medical authority confirmed? Yes   Relationship to patient of person spoken to? Self   Refill Screening Questions    Changes to allergies? No   Changes to medications? No   New conditions since last clinic visit? No   Unplanned office visit, urgent care, ED, or hospital admission in the last 4 weeks? No   How does patient/caregiver feel medication is working? Too soon to tell   Financial problems or insurance changes? No   How many doses of  "your specialty medications were missed in the last 4 weeks? 0   Would patient like to speak to a pharmacist? No   When does the patient need to receive the medication? 06/28/23   Refill Delivery Questions    How will the patient receive the medication? MEDRx   When does the patient need to receive the medication? 06/28/23   Shipping Address Home   Address in [facility] confirmed and updated if neccessary? Yes   Expected Copay ($) 0   Is the patient able to afford the medication copay? Yes   Payment Method zero copay   Days supply of Refill 28   Supplies needed? No supplies needed   Refill activity completed? Yes   Refill activity plan Refill scheduled   Shipment/Pickup Date: 06/27/23            Current Outpatient Medications   Medication Sig    aspirin (ECOTRIN) 81 MG EC tablet Take 1 tablet (81 mg total) by mouth once daily.    BD ULTRA-FINE BEAR PEN NEEDLE 32 gauge x 5/32" Ndle USE 1 PEN NEEDLE SUBCUTANOUESLY QD    blood-glucose meter (ACCU-CHEK FRANK PLUS METER MISC) Accu-Chek Frank Plus Meter   USE DEVICE TO CHECK GLUCOSE THREE TIMES DAILY    carvedilol (COREG) 6.25 MG tablet Take 1 tablet (6.25 mg total) by mouth 2 (two) times daily with meals.    ergocalciferol (ERGOCALCIFEROL) 50,000 unit Cap Take 50,000 Units by mouth every 7 days. sunday    FLUoxetine 10 MG Tab Take 10 mg by mouth once daily.    furosemide (LASIX) 20 MG tablet Take 1 tablet (20 mg total) by mouth once daily.    gabapentin (NEURONTIN) 600 MG tablet gabapentin 600 mg tablet   TK 1 T PO TID    glipiZIDE (GLUCOTROL) 10 MG tablet glipizide 10 mg tablet   TK 1 T PO BID    hydrocodone-acetaminophen 10-325mg (NORCO)  mg Tab Take 1 tablet by mouth 3 (three) times daily.    lactulose (CHRONULAC) 20 gram/30 mL Soln Take 30 mLs (20 g total) by mouth 2 (two) times daily. Goal of 3-5 soft stools each day    losartan-hydrochlorothiazide 100-25 mg (HYZAAR) 100-25 mg per tablet Take 1 tablet by mouth once daily.    metFORMIN (GLUCOPHAGE) 1000 " MG tablet Take 1,000 mg by mouth 2 (two) times daily with meals.    nystatin (MYCOSTATIN) powder 2 (two) times daily as needed.     pantoprazole (PROTONIX) 20 MG tablet Take 1 tablet (20 mg total) by mouth once daily. Take 4 hours after epclusa is taken w/ food    pantoprazole (PROTONIX) 40 MG tablet Take 1 tablet (40 mg total) by mouth once daily.    simvastatin (ZOCOR) 40 MG tablet Take 40 mg by mouth every evening.    sofosbuvir-velpatasvir 400-100 mg Tab Take 1 tablet by mouth once daily.    TOUJEO SOLOSTAR U-300 INSULIN 300 unit/mL (1.5 mL) InPn pen inject 70 units subcutaneously once daily    traZODone (DESYREL) 100 MG tablet Take 100 mg by mouth every evening.    umeclidinium-vilanteroL (ANORO ELLIPTA) 62.5-25 mcg/actuation DsDv Inhale 1 puff into the lungs once daily. Controller    zolpidem (AMBIEN) 5 MG Tab Take 2 tablets (10 mg total) by mouth nightly as needed.   Last reviewed on 4/20/2023  2:52 PM by Jennifer B. Scheuermann, PA    Review of patient's allergies indicates:  No Known Allergies Last reviewed on  4/20/2023 2:52 PM by Jennifer B Scheuermann      Tasks added this encounter   No tasks added.   Tasks due within next 3 months   No tasks due.     Yeni Thakur, PharmD  [facility]  [address] [place] [id number]  Phone: [phone]  Fax: [phone]

## 2023-07-18 ENCOUNTER — SPECIALTY PHARMACY (OUTPATIENT)
Dept: PHARMACY | Facility: CLINIC | Age: 63
End: 2023-07-18
Payer: MEDICARE

## 2023-07-18 NOTE — TELEPHONE ENCOUNTER
Reached patient regarding Epclusa fill 4 of 6. Based on start date of 5/2 pt should only have 7 tablets remaining. However, pt counted her on hand quantity while on the phone and reports 20 tablets remaining (~13 missed doses since starting treatment). Pt admits to 3-4 missed doses in the past few weeks some of which were due to her feeling ill with N/V. She also reports some trouble with her memory. However, pt confirms she is taking Epclusa appropriately at 12 PM with lunch and takes Protonix 20 mg EXACTLY 4 hours later at 4 PM.     Counseled pt on the importance of medication adherence. Recommended pt try setting an alarm on her phone to help remember her doses. Pt agreeable. Will f/u with pt on 7/31 as refill should be needed by 8/7.

## 2023-07-21 ENCOUNTER — TELEPHONE (OUTPATIENT)
Dept: HEPATOLOGY | Facility: CLINIC | Age: 63
End: 2023-07-21
Payer: MEDICARE

## 2023-07-21 NOTE — TELEPHONE ENCOUNTER
I left a VM reminding patient of upcoming appt with PA Scheuermann on 7/19/23.  Patient cancelled scheduled appt with PA Scheuermann on 7/20/23.  Letter sent asking that she contact hepatology for rescheduling.

## 2023-07-21 NOTE — TELEPHONE ENCOUNTER
Pt was scheduled for f/u virtual visit 7/20/23 but cancelled  Pls r/s (her daughter was involved during last video visit so we may need to coordinate w/ her also)

## 2023-07-27 ENCOUNTER — OFFICE VISIT (OUTPATIENT)
Dept: HEPATOLOGY | Facility: CLINIC | Age: 63
End: 2023-07-27
Payer: MEDICARE

## 2023-07-27 ENCOUNTER — TELEPHONE (OUTPATIENT)
Dept: HEPATOLOGY | Facility: CLINIC | Age: 63
End: 2023-07-27
Payer: MEDICARE

## 2023-07-27 DIAGNOSIS — K74.60 HEPATIC CIRRHOSIS, UNSPECIFIED HEPATIC CIRRHOSIS TYPE, UNSPECIFIED WHETHER ASCITES PRESENT: Primary | ICD-10-CM

## 2023-07-27 PROCEDURE — 1160F PR REVIEW ALL MEDS BY PRESCRIBER/CLIN PHARMACIST DOCUMENTED: ICD-10-PCS | Mod: CPTII,95,, | Performed by: PHYSICIAN ASSISTANT

## 2023-07-27 PROCEDURE — 3046F HEMOGLOBIN A1C LEVEL >9.0%: CPT | Mod: CPTII,95,, | Performed by: PHYSICIAN ASSISTANT

## 2023-07-27 PROCEDURE — 99214 OFFICE O/P EST MOD 30 MIN: CPT | Mod: 95,,, | Performed by: PHYSICIAN ASSISTANT

## 2023-07-27 PROCEDURE — 1159F MED LIST DOCD IN RCRD: CPT | Mod: CPTII,95,, | Performed by: PHYSICIAN ASSISTANT

## 2023-07-27 PROCEDURE — 1160F RVW MEDS BY RX/DR IN RCRD: CPT | Mod: CPTII,95,, | Performed by: PHYSICIAN ASSISTANT

## 2023-07-27 PROCEDURE — 99214 PR OFFICE/OUTPT VISIT, EST, LEVL IV, 30-39 MIN: ICD-10-PCS | Mod: 95,,, | Performed by: PHYSICIAN ASSISTANT

## 2023-07-27 PROCEDURE — 1159F PR MEDICATION LIST DOCUMENTED IN MEDICAL RECORD: ICD-10-PCS | Mod: CPTII,95,, | Performed by: PHYSICIAN ASSISTANT

## 2023-07-27 PROCEDURE — 3046F PR MOST RECENT HEMOGLOBIN A1C LEVEL > 9.0%: ICD-10-PCS | Mod: CPTII,95,, | Performed by: PHYSICIAN ASSISTANT

## 2023-07-27 NOTE — TELEPHONE ENCOUNTER
----- Message from Jennifer B. Scheuermann, PA sent at 7/27/2023  8:39 AM CDT -----  Pls schedule: 1. Video visit in 3 months.  2. CBC,CMP, INR, AFP, U/S, video visit in 1/2024

## 2023-07-27 NOTE — PROGRESS NOTES
HEPATOLOGY VIDEO VISIT NOTE - HCV clinic  The patient location is: home  Visit type: audiovisual  Converted to audio due to technical issues    Each patient to whom he or she provides medical services by telemedicine is:  (1) informed of the relationship between the physician and patient and the respective role of any other health care provider with respect to management of the patient; and (2) notified that he or she may decline to receive medical services by telemedicine and may withdraw from such care at any time.    CHIEF COMPLAINT: Hepatitis C, Cirrhosis    HISTORY       This is a 63 y.o. Black or  female w/ HCV Cirrhosis being seen for f/u    Interval history:  Began 24 wks epclusa 5/2/23:   Virologic response: yes, HCV neg 7/17/23   Tolerability concerns: no   Adherence concerns: missed ~3 doses due to episode of self-limited n/v. Back on track now.  *On reduced protonix 20mg now - tolerating well.    Routine cirrhosis labs / imaging:  U/S 7/2023 - no liver lesions  Labs 7/2023 - stable CBC, CMP, INR. Normal AFP    Has not yet seen Dr Brock to request EGD for EV screening    Current symptoms of hepatic decompensation:  Ascites - no  EVETTE - intermittent  Diuretic use - lasix 20mg daily  TBili elevation - no  HE / confusion / memory problems - yes but well controlled on lactulose QOD  EV bleed / hematemesis melena - no      HCV History:  Recently diagnosed  (+) HCV risk - blood transfusion 1980s  Cee 1b  Treatment naive prior to epclusa    Liver staging:  FibroSURE 3/2023 - A0-1, F3-4  Labs sx support staging    Cirrhosis history:  Mildly decompensated but stable: Mild HE, intermittent TBili elevation  No evidence portal HTN: spleen normal, plt normal    MELD 3.0: 10 at 7/17/2023  9:27 AM  MELD-Na: 9 at 7/17/2023  9:27 AM  Calculated from:  Serum Creatinine: 1.00 mg/dL at 7/17/2023  9:27 AM  Serum Sodium: 141 mmol/L (Using max of 137 mmol/L) at 7/17/2023  9:27 AM  Total Bilirubin: 1.4 mg/dL  at 7/17/2023  9:27 AM  Serum Albumin: 4.0 g/dL (Using max of 3.5 g/dL) at 7/17/2023  9:27 AM  INR(ratio): 1.1 at 7/17/2023  9:27 AM  Age at listing (hypothetical): 63 years  Sex: Female at 7/17/2023  9:27 AM      Cirrhosis maintenance:  HCC screening - Up to date 7/2023  Varices screening - EGD needed  HAV immunity - documented (+) HAVAB   HBV immunity - lacking      PMH, PSH, SOCIAL HX, FAMILY HX      Reviewed in Epic  Pertinent findings:  FAMILY HX: neg for liver diease  SOCIAL HX: resides in [place], daughter involved in care resides in [place]  Alcohol - no heavy use  Drugs - no      ROS: as per HPI    PHYSICAL EXAM:  Friendly Black or  female, in no acute distress; alert and oriented to person, place and time  LUNGS: Normal respiratory effort.  NEURO/PSYCH: Memory intact. Thought and speech pattern appropriate. Behavior normal. No depression or anxiety noted.      PERTINENT DIAGNOSTIC RESULTS      Lab Results   Component Value Date    WBC 9.30 07/17/2023    HGB 14.7 07/17/2023     07/17/2023     Lab Results   Component Value Date    INR 1.1 07/17/2023     Lab Results   Component Value Date    AST 61 (H) 07/17/2023    ALT 55 (H) 07/17/2023    BILITOT 1.4 (H) 07/17/2023    ALBUMIN 4.0 07/17/2023    ALKPHOS 93 07/17/2023    CREATININE 1.00 07/17/2023    BUN 5 (L) 07/17/2023     07/17/2023    K 3.3 (L) 07/17/2023    AFP 4.4 07/17/2023     US ABDOMEN LIMITED_LIVER - 7/17/23     CLINICAL HISTORY:  Unspecified cirrhosis of liver     FINDINGS:  The liver demonstrates a mildly coarse echotexture without focal lesion. No gallstones. The common duct measures 4 mm. The hepatic and portal veins are patent.  Portal vein velocity 18 centimeters/second.     Impression:     Mildly coarse echotexture, suggesting steatosis or hepatocellular disease.  No focal liver lesion detected..      ASSESSMENT        63 y.o. Black or  female with:  CHRONIC HCV, GENOTYPE 1B - on epclusa now w/  documented virologic response  -- elevated transaminases    2.   CIRRHOSIS, mildly decompensated  -- MELD 10  -- HCC screening - up to date 7/2023  -- HE - stable on lactulose  -- EV screening - needed    3. GERD -- well controlled on reduced protonix 20mg    PLAN     Continue Epclusa x 24 weeks w/ labs for SVR as scheduled  Continue lactulose w/ dose titration for HE symptoms  CMP in 6 weeks. Watch K+, appears she's on lasix (from PCP) w/o supplement  Next HCC screening due 1/2024 w/ visit  Pt to reach out to Dr Brock regarding need for EGD for varices screening  6.    F/U visit in 3 months. Discuss HBV vaccine at next visit    Cc: Hans Brock MD      __________________________________________________________________    Duration of encounter: 36 min  This includes face-to-face time and non face-to-face time preparing to see the patient (eg, review of tests), obtaining and/or reviewing separately obtained history, documenting clinical information in the electronic or other health record, independently interpreting resultsand communicating results to the patient/family/caregiver, or care coordination.

## 2023-07-27 NOTE — TELEPHONE ENCOUNTER
----- Message from Jennifer B. Scheuermann, PA sent at 7/27/2023  8:37 AM CDT -----  Pls schedule CMP in 6 weeks

## 2023-07-27 NOTE — TELEPHONE ENCOUNTER
Lab draw scheduled 9/7/23, virtual visit 10/19/23, hcc testing 1/18/24 and virtual visit 1/25/24; appt reminder notices mailed.

## 2023-07-31 ENCOUNTER — SPECIALTY PHARMACY (OUTPATIENT)
Dept: PHARMACY | Facility: CLINIC | Age: 63
End: 2023-07-31
Payer: MEDICARE

## 2023-07-31 NOTE — TELEPHONE ENCOUNTER
Specialty Pharmacy - Refill Coordination - AG Epclusa fill 4 of 6    Specialty Medication Orders Linked to Encounter      Flowsheet Row Most Recent Value   Medication #1 sofosbuvir-velpatasvir 400-100 mg Tab (Order#[id number], Rx#[id number])          AG Epclusa refill (4 of 6) confirmed and reassessment complete. Confirmed 2 patient identifiers - name and . Therapy appropriate.      Patient has 5-6 doses of Epclusa remaining and takes it around the same time daily. Pt reports no side effects so far. Pt denies any missed doses in the past few weeks. No new medications, no new allergies or health conditions reported at this time. Allergies reviewed and medication reconciliation complete (reviewed and documented in [facility]). Pt confirms that she is to taking Epclusa with food and then Protonix exactly 4 hours later. Disease education reviewed (including transmission and prevention). Patient counseled on importance of maintaining adherence and keeping lab appointments which were scheduled. All questions answered and addressed to patients satisfaction. Advised to call [facility] and provider if any issues arise.  Pt verbalized understanding.    Refill Questions - Documented Responses      Flowsheet Row Most Recent Value   Patient Availability and HIPAA Verification    Does patient want to proceed with activity? Yes   HIPAA/medical authority confirmed? Yes   Relationship to patient of person spoken to? Self   Refill Screening Questions    Changes to allergies? No   Changes to medications? No   New conditions since last clinic visit? No   Unplanned office visit, urgent care, ED, or hospital admission in the last 4 weeks? No   How does patient/caregiver feel medication is working? Too soon to tell   Financial problems or insurance changes? No   How many doses of your specialty medications were missed in the last 4 weeks? 0   Would patient like to speak to a pharmacist? No   When does the patient  "need to receive the medication? 08/04/23   Refill Delivery Questions    How will the patient receive the medication? MEDRx   When does the patient need to receive the medication? 08/04/23   Shipping Address Home   Address in [facility] confirmed and updated if neccessary? Yes   Expected Copay ($) 0   Is the patient able to afford the medication copay? Yes   Payment Method zero copay   Days supply of Refill 28   Supplies needed? No supplies needed   Refill activity completed? Yes   Refill activity plan Refill scheduled   Shipment/Pickup Date: 08/01/23            Current Outpatient Medications   Medication Sig    aspirin (ECOTRIN) 81 MG EC tablet Take 1 tablet (81 mg total) by mouth once daily.    BD ULTRA-FINE BEAR PEN NEEDLE 32 gauge x 5/32" Ndle USE 1 PEN NEEDLE SUBCUTANOUESLY QD    blood-glucose meter (ACCU-CHEK FRANK PLUS METER MISC) Accu-Chek Frank Plus Meter   USE DEVICE TO CHECK GLUCOSE THREE TIMES DAILY    carvedilol (COREG) 6.25 MG tablet Take 1 tablet (6.25 mg total) by mouth 2 (two) times daily with meals.    ergocalciferol (ERGOCALCIFEROL) 50,000 unit Cap Take 50,000 Units by mouth every 7 days. sunday    FLUoxetine 10 MG Tab Take 10 mg by mouth once daily.    furosemide (LASIX) 20 MG tablet Take 1 tablet (20 mg total) by mouth once daily.    gabapentin (NEURONTIN) 600 MG tablet gabapentin 600 mg tablet   TK 1 T PO TID    glipiZIDE (GLUCOTROL) 10 MG tablet glipizide 10 mg tablet   TK 1 T PO BID    hydrocodone-acetaminophen 10-325mg (NORCO)  mg Tab Take 1 tablet by mouth 3 (three) times daily.    lactulose (CHRONULAC) 20 gram/30 mL Soln Take 30 mLs (20 g total) by mouth 2 (two) times daily. Goal of 3-5 soft stools each day    losartan-hydrochlorothiazide 100-25 mg (HYZAAR) 100-25 mg per tablet Take 1 tablet by mouth once daily.    metFORMIN (GLUCOPHAGE) 1000 MG tablet Take 1,000 mg by mouth 2 (two) times daily with meals.    nystatin (MYCOSTATIN) powder 2 (two) times daily as needed.     " pantoprazole (PROTONIX) 20 MG tablet Take 1 tablet (20 mg total) by mouth once daily. Take 4 hours after epclusa is taken w/ food    pantoprazole (PROTONIX) 40 MG tablet Take 1 tablet (40 mg total) by mouth once daily.    simvastatin (ZOCOR) 40 MG tablet Take 40 mg by mouth every evening.    sofosbuvir-velpatasvir 400-100 mg Tab Take 1 tablet by mouth once daily.    TOUJEO SOLOSTAR U-300 INSULIN 300 unit/mL (1.5 mL) InPn pen inject 70 units subcutaneously once daily    traZODone (DESYREL) 100 MG tablet Take 100 mg by mouth every evening.    umeclidinium-vilanteroL (ANORO ELLIPTA) 62.5-25 mcg/actuation DsDv Inhale 1 puff into the lungs once daily. Controller    zolpidem (AMBIEN) 5 MG Tab Take 2 tablets (10 mg total) by mouth nightly as needed.   Last reviewed on 7/27/2023  8:39 AM by Jennifer B. Scheuermann, PA    Review of patient's allergies indicates:  No Known Allergies Last reviewed on  7/27/2023 8:39 AM by Jennifer B Scheuermann      Tasks added this encounter   No tasks added.   Tasks due within next 3 months   No tasks due.     Yeni Thakur, PharmD  [facility]  [address] [place] [id number]  Phone: [phone]  Fax: [phone]

## 2023-09-09 PROBLEM — R63.8 INADEQUATE ORAL INTAKE: Status: ACTIVE | Noted: 2023-09-09

## 2023-09-10 PROBLEM — R63.8 INADEQUATE ORAL INTAKE: Status: RESOLVED | Noted: 2023-09-09 | Resolved: 2023-09-10

## 2023-09-11 ENCOUNTER — TELEPHONE (OUTPATIENT)
Dept: HEPATOLOGY | Facility: CLINIC | Age: 63
End: 2023-09-11
Payer: MEDICARE

## 2023-09-11 DIAGNOSIS — E87.6 HYPOKALEMIA: Primary | ICD-10-CM

## 2023-09-11 NOTE — TELEPHONE ENCOUNTER
Chart review  Started 24 wks epclusa 23  EGD 23 - no EV    Admitted for observation 23 - 9/10/23 for STEVEN w/ electrolyte derangement    - appears pt's boyfriend   and due to grief reaction / adjustment d/o had been taking extra doses sleeping pills and pain meds.     Pls call pt to check on her.  Make sure she has f/u visit scheduled w/ PCP in next week or so (I don't think he's at [facility] so I'm not sure if it's scheduled.)  Pls see if she still has epclusa and is still taking it once daily w/ food. If using protonix that would be taken 4 hours after epclusa dose.  Potassium yesterday still a little low so if not seeing PCP w/in a week, schedule BMP Thursday.

## 2023-09-12 DIAGNOSIS — K74.60 HEPATIC CIRRHOSIS, UNSPECIFIED HEPATIC CIRRHOSIS TYPE, UNSPECIFIED WHETHER ASCITES PRESENT: Primary | ICD-10-CM

## 2023-09-12 RX ORDER — POTASSIUM CHLORIDE 20 MEQ/1
20 TABLET, EXTENDED RELEASE ORAL DAILY
Qty: 30 TABLET | Refills: 0 | Status: SHIPPED | OUTPATIENT
Start: 2023-09-12

## 2023-09-12 NOTE — TELEPHONE ENCOUNTER
I spoke with patient and msg from PA Scheuermann relayed.  She states that she will  K+ and start taking med today and have lab draw done on Thursday as ordered.

## 2023-09-12 NOTE — TELEPHONE ENCOUNTER
Pls tell pt I sent KCl 20 mEq tablets to local pharmacy  Take one per day. After next lab review I will tell her if she needs to continue    thanks

## 2023-09-12 NOTE — TELEPHONE ENCOUNTER
I spoke with patient and msg from PA Scheuermann relayed.  She reports running out of Klor Con 20 meq Powder about 1 mth ago and not being able to reach PCP for refills.  Patient unsure why powder was prescribed because she doesn't have a problem with talking tablets.  She also reports making several attempts to setup a f/u visit with Dr. Preston Montesinos's office after hospital discharge but has been unsuccessful in speaking with staff.  I attempted to reach his office and was sent to the answering service.  Patient's hospital discharge note faxed to Dr. Montesinos's office.  I stressed that patient needed a hospital discharge f/u visit with PCP.  BMP scheduled 9/14/23 as ordered.

## 2023-09-12 NOTE — TELEPHONE ENCOUNTER
Attempt made to reach patient.  Unable to LVM.  Attempt made to reach daughter (Mayra). Msg from PA Scheuermann left on her VM.  I asked that she call hepatology back.

## 2023-09-14 ENCOUNTER — TELEPHONE (OUTPATIENT)
Dept: HEPATOLOGY | Facility: CLINIC | Age: 63
End: 2023-09-14
Payer: MEDICARE

## 2023-09-14 DIAGNOSIS — K74.60 HEPATIC CIRRHOSIS, UNSPECIFIED HEPATIC CIRRHOSIS TYPE, UNSPECIFIED WHETHER ASCITES PRESENT: Primary | ICD-10-CM

## 2023-09-14 NOTE — TELEPHONE ENCOUNTER
Attempt made to reach patient.  Msg from PA Scheuermann left on her VM and mailed to her.  Lab draw scheduled 9/28/23; appt reminder notice mailed.  I spoke with patient's daughter msg from PA Scheuermann relayed.

## 2023-09-14 NOTE — TELEPHONE ENCOUNTER
Labs 9/14 reviewed  K+ up from 3.1 --> 3.4    Pls tell pt K+ improving  Continue KCl 20 mEq daily    Schedule BMP again in 2 weeks

## 2023-10-09 ENCOUNTER — TELEPHONE (OUTPATIENT)
Dept: HEPATOLOGY | Facility: CLINIC | Age: 63
End: 2023-10-09
Payer: MEDICARE

## 2023-10-09 NOTE — TELEPHONE ENCOUNTER
"----- Message from Erick Ramirez sent at 10/9/2023 10:57 AM CDT -----  Consult/Advisory:      Name Of Caller: Self      Contact Preference?: [phone] (Mobile)      What is the nature of the call?: Requesting to set up labs for tomorrow @ [facility]      Additional Notes:  "Thank you for all that you do for our patients"         "

## 2023-10-10 ENCOUNTER — TELEPHONE (OUTPATIENT)
Dept: HEPATOLOGY | Facility: CLINIC | Age: 63
End: 2023-10-10
Payer: MEDICARE

## 2023-10-10 NOTE — TELEPHONE ENCOUNTER
Labs 10/10 reviewed  K+  stable 4.2    Pls tell pt K+normal  Keep appt w/ me later this month.    It appears she was able to see PCP so future mngmt of K+ will be deferred to him since I am not giving her meds to make her K+ low    thanks

## 2023-10-19 ENCOUNTER — OFFICE VISIT (OUTPATIENT)
Dept: HEPATOLOGY | Facility: CLINIC | Age: 63
End: 2023-10-19
Payer: MEDICARE

## 2023-10-19 ENCOUNTER — TELEPHONE (OUTPATIENT)
Dept: HEPATOLOGY | Facility: CLINIC | Age: 63
End: 2023-10-19

## 2023-10-19 DIAGNOSIS — R74.8 ABNORMAL TRANSAMINASES: ICD-10-CM

## 2023-10-19 DIAGNOSIS — K76.82 HEPATIC ENCEPHALOPATHY: ICD-10-CM

## 2023-10-19 DIAGNOSIS — K21.9 GASTROESOPHAGEAL REFLUX DISEASE WITHOUT ESOPHAGITIS: ICD-10-CM

## 2023-10-19 DIAGNOSIS — K74.60 HEPATIC CIRRHOSIS, UNSPECIFIED HEPATIC CIRRHOSIS TYPE, UNSPECIFIED WHETHER ASCITES PRESENT: Primary | ICD-10-CM

## 2023-10-19 DIAGNOSIS — K29.70 GASTRITIS, PRESENCE OF BLEEDING UNSPECIFIED, UNSPECIFIED CHRONICITY, UNSPECIFIED GASTRITIS TYPE: ICD-10-CM

## 2023-10-19 DIAGNOSIS — B18.2 CHRONIC HEPATITIS C WITHOUT HEPATIC COMA: ICD-10-CM

## 2023-10-19 PROCEDURE — 4010F PR ACE/ARB THEARPY RXD/TAKEN: ICD-10-PCS | Mod: CPTII,95,, | Performed by: PHYSICIAN ASSISTANT

## 2023-10-19 PROCEDURE — 3046F PR MOST RECENT HEMOGLOBIN A1C LEVEL > 9.0%: ICD-10-PCS | Mod: CPTII,95,, | Performed by: PHYSICIAN ASSISTANT

## 2023-10-19 PROCEDURE — 1159F PR MEDICATION LIST DOCUMENTED IN MEDICAL RECORD: ICD-10-PCS | Mod: CPTII,95,, | Performed by: PHYSICIAN ASSISTANT

## 2023-10-19 PROCEDURE — 1160F PR REVIEW ALL MEDS BY PRESCRIBER/CLIN PHARMACIST DOCUMENTED: ICD-10-PCS | Mod: CPTII,95,, | Performed by: PHYSICIAN ASSISTANT

## 2023-10-19 PROCEDURE — 3046F HEMOGLOBIN A1C LEVEL >9.0%: CPT | Mod: CPTII,95,, | Performed by: PHYSICIAN ASSISTANT

## 2023-10-19 PROCEDURE — 99214 OFFICE O/P EST MOD 30 MIN: CPT | Mod: 95,,, | Performed by: PHYSICIAN ASSISTANT

## 2023-10-19 PROCEDURE — 99214 PR OFFICE/OUTPT VISIT, EST, LEVL IV, 30-39 MIN: ICD-10-PCS | Mod: 95,,, | Performed by: PHYSICIAN ASSISTANT

## 2023-10-19 PROCEDURE — 4010F ACE/ARB THERAPY RXD/TAKEN: CPT | Mod: CPTII,95,, | Performed by: PHYSICIAN ASSISTANT

## 2023-10-19 PROCEDURE — 1159F MED LIST DOCD IN RCRD: CPT | Mod: CPTII,95,, | Performed by: PHYSICIAN ASSISTANT

## 2023-10-19 PROCEDURE — 1160F RVW MEDS BY RX/DR IN RCRD: CPT | Mod: CPTII,95,, | Performed by: PHYSICIAN ASSISTANT

## 2023-10-19 NOTE — PROGRESS NOTES
HEPATOLOGY VIDEO VISIT NOTE - HCV clinic  The patient location is: home  Visit type: audiovisual  Converted to audio due to technical issues    Each patient to whom he or she provides medical services by telemedicine is:  (1) informed of the relationship between the physician and patient and the respective role of any other health care provider with respect to management of the patient; and (2) notified that he or she may decline to receive medical services by telemedicine and may withdraw from such care at any time.    CHIEF COMPLAINT: Hepatitis C, Cirrhosis    HISTORY       This is a 63 y.o. Black or  female w/ HCV Cirrhosis being seen for f/u    Interval history:  Admitted for observation 9/2023 for STEVEN w/ low K   Corrected, future mngmt deferred to PCP, not due to meds from me    Also c/o intermittent right sided pain    Will finish 24 wks epclusa soon - has ~ 8 pills remaining   Virologic response: yes, HCV neg 7/17/23   Tolerability concerns: no   Adherence concerns: missed few doses throughout  *On reduced protonix 20mg now - tolerating well.    Current symptoms of hepatic decompensation:  Ascites - no  EVETTE - intermittent  Diuretic use - no  TBili elevation - no  HE / confusion / memory problems - yes but well controlled on lactulose QOD  EV bleed / hematemesis melena - no      HCV History:  Recently diagnosed  (+) HCV risk - blood transfusion 1980s  Cee 1b  Treatment naive prior to epclusa    Liver staging:  FibroSURE 3/2023 - A0-1, F3-4  Labs sx support staging    Cirrhosis history:  Mildly decompensated but stable: Mild HE, intermittent TBili elevation  No evidence portal HTN: spleen normal, plt normal  Varices: no  Variceal bleed: n/a  Variceal banding: n/a    MELD 3.0: 10 at 7/17/2023  9:27 AM  MELD-Na: 9 at 7/17/2023  9:27 AM  Calculated from:  Serum Creatinine: 1.00 mg/dL at 7/17/2023  9:27 AM  Serum Sodium: 141 mmol/L (Using max of 137 mmol/L) at 7/17/2023  9:27 AM  Total Bilirubin:  1.4 mg/dL at 2023  9:27 AM  Serum Albumin: 4.0 g/dL (Using max of 3.5 g/dL) at 2023  9:27 AM  INR(ratio): 1.1 at 2023  9:27 AM  Age at listing (hypothetical): 63 years  Sex: Female at 2023  9:27 AM    Cirrhosis maintenance:  HCC screening - Up to date 2023  Varices screening - EGD 2023: no EV  HAV immunity - documented (+) HAVAB   HBV immunity - lacking      PMH, PSH, SOCIAL HX, FAMILY HX      Reviewed in Epic  Pertinent findings:  FAMILY HX: neg for liver diease  SOCIAL HX: resides in [place], daughter involved in care resides in [place]  Alcohol - no heavy use  Drugs - no      ROS: as per HPI    PHYSICAL EXAM:  Friendly Black or  female, in no acute distress; alert and oriented to person, place and time  LUNGS: Normal respiratory effort.  NEURO/PSYCH: Memory intact. Thought and speech pattern appropriate. Behavior normal. No depression or anxiety noted.      PERTINENT DIAGNOSTIC RESULTS      Lab Results   Component Value Date    WBC 7.70 2023    HGB 10.8 (L) 2023     2023     Lab Results   Component Value Date    INR 1.1 2023     Lab Results   Component Value Date    AST 45 (H) 2023    ALT 32 2023    BILITOT 0.5 2023    ALBUMIN 3.0 (L) 2023    ALKPHOS 86 2023    CREATININE 0.90 10/10/2023    BUN 15 10/10/2023     10/10/2023    K 4.2 10/10/2023    AFP 4.4 2023       ASSESSMENT        63 y.o. Black or  female with:  CHRONIC HCV, GENOTYPE 1B - on epclusa now, documented virologic response  -- elevated transaminases    2.   CIRRHOSIS, mildly decompensated  -- MELD 10  -- HCC screening: up to date 2023  -- HE - stable on lactulose  -- EGD for EV screenin2023 - no EV    3. GERD -- well controlled on reduced protonix 20mg    PLAN     Continue Epclusa x 24 weeks w/ labs for SVR in  as scheduled  If needed, can resume increased protonix dose of 40mg after finished Epclusa  Continue  lactulose w/ dose titration for HE symptoms  Next HCC screening w/ visit due 1/2024 - already scheduled  EGD due 8/2025 as long as HCV is cured.  HBV vaccine recommended - pt to ask PCP  See PCP for Rt sided pain  F/U visit in 3 months.     Cc: Hans Brock MD      __________________________________________________________________    Duration of encounter: 30 min  This includes face-to-face time and non face-to-face time preparing to see the patient (eg, review of tests), obtaining and/or reviewing separately obtained history, documenting clinical information in the electronic or other health record, independently interpreting resultsand communicating results to the patient/family/caregiver, or care coordination.

## 2023-11-01 ENCOUNTER — PATIENT MESSAGE (OUTPATIENT)
Dept: HEPATOLOGY | Facility: CLINIC | Age: 63
End: 2023-11-01
Payer: MEDICARE

## 2023-11-02 ENCOUNTER — TELEPHONE (OUTPATIENT)
Dept: HEPATOLOGY | Facility: CLINIC | Age: 63
End: 2023-11-02
Payer: MEDICARE

## 2023-11-02 NOTE — TELEPHONE ENCOUNTER
----- Message from Michelle Scales MA sent at 11/1/2023  5:01 PM CDT -----  Regarding: FW: Refill  Contact: Shara [phone]    ----- Message -----  From: Agueda Ivy  Sent: 11/1/2023   4:02 PM CDT  To: Scheuermann Jennifer B Staff  Subject: Refill                                                   Name of Caller:   [facility] [phone]       Contact Preference:   Nature of Call: Requesting a call back to confirm if patient should be on these medication would like to get approval before refilling      osartan (COZAAR) 100 MG tablet    metoprolol succinate (TOPROL-XL) 25 MG 24 hr tablet

## 2023-11-16 PROBLEM — G62.9 NEUROPATHY: Status: ACTIVE | Noted: 2023-11-16

## 2023-11-16 PROBLEM — T50.901A ACCIDENTAL OVERDOSE: Status: ACTIVE | Noted: 2023-11-16

## 2023-12-04 ENCOUNTER — PATIENT MESSAGE (OUTPATIENT)
Dept: HEPATOLOGY | Facility: CLINIC | Age: 63
End: 2023-12-04
Payer: MEDICARE

## 2023-12-11 PROBLEM — N17.9 AKI (ACUTE KIDNEY INJURY): Status: RESOLVED | Noted: 2021-03-26 | Resolved: 2023-12-11

## 2024-01-18 ENCOUNTER — PATIENT MESSAGE (OUTPATIENT)
Dept: HEPATOLOGY | Facility: CLINIC | Age: 64
End: 2024-01-18
Payer: MEDICARE

## 2024-02-05 ENCOUNTER — OFFICE VISIT (OUTPATIENT)
Dept: HEPATOLOGY | Facility: CLINIC | Age: 64
End: 2024-02-05
Payer: MEDICARE

## 2024-02-05 DIAGNOSIS — B18.2 CHRONIC HEPATITIS C WITHOUT HEPATIC COMA: ICD-10-CM

## 2024-02-05 DIAGNOSIS — K76.82 HEPATIC ENCEPHALOPATHY: ICD-10-CM

## 2024-02-05 DIAGNOSIS — K74.60 HEPATIC CIRRHOSIS, UNSPECIFIED HEPATIC CIRRHOSIS TYPE, UNSPECIFIED WHETHER ASCITES PRESENT: Primary | ICD-10-CM

## 2024-02-05 PROCEDURE — 99214 OFFICE O/P EST MOD 30 MIN: CPT | Mod: 95,,, | Performed by: PHYSICIAN ASSISTANT

## 2024-02-05 NOTE — PROGRESS NOTES
"HEPATOLOGY VIDEO VISIT NOTE - HCV clinic  The patient location is: home  Visit type: audiovisual    Each patient to whom he or she provides medical services by telemedicine is:  (1) informed of the relationship between the physician and patient and the respective role of any other health care provider with respect to management of the patient; and (2) notified that he or she may decline to receive medical services by telemedicine and may withdraw from such care at any time.    CHIEF COMPLAINT: Hepatitis C, Cirrhosis  (Daughter Crystal present during visit)    HISTORY       This is a 63 y.o. Black or  female w/ HCV Cirrhosis being seen for f/u    HCV History:  Recently diagnosed  (+) HCV risk - blood transfusion 1980s  Cee 1b  Treatment naive prior to epclusa    Liver staging:  FibroSURE 3/2023 - A0-1, F3-4  Labs sx support staging    Cirrhosis history:  Mildly decompensated but stable: Mild HE, intermittent TBili elevation  No evidence portal HTN: spleen normal, plt normal  Varices: no  Variceal bleed: n/a  Variceal banding: n/a    MELD 10    Cirrhosis maintenance:  HCC screening - Up to date 7/2023  Varices screening - EGD 8/29/2023: no EV  HAV immunity - documented (+) HAVAB   HBV immunity - lacking    Interval history:  Completed 24 wks epclusa very early December   Virologic response: yes, HCV neg 7/17/23   Tolerability concerns: no   Adherence concerns: yes, multiple missed doses, SVR labs adjusted    Current symptoms of hepatic decompensation:  Ascites - no  EVETTE - intermittent  Diuretic use - no  TBili elevation - no  HE - yes, seems to still have some "cloudy thinking"    Taking lactulose QOD, ~1 BM daily  EV bleed / hematemesis melena - no    Routine cirrhosis labs / imaging last week:  U/S w/o liver lesions or ascites  Labs w/ stable CBC, CMP, INR. Normal AFP. MELD 9    Twinrix dose #1 - 12/1/23    PMH, PSH, SOCIAL HX, FAMILY HX      Reviewed in Epic  Pertinent findings:  FAMILY HX: neg for " liver diease  SOCIAL HX: resides in [place], daughter involved in care resides in [place]  Alcohol - no heavy use  Drugs - no      ROS: as per HPI    PHYSICAL EXAM:  Friendly Black or  female, in no acute distress; alert and oriented to person, place and time  LUNGS: Normal respiratory effort.  NEURO/PSYCH: Memory grossly intact. Thought and speech pattern appropriate. Behavior normal. No depression or anxiety noted.      PERTINENT DIAGNOSTIC RESULTS      Lab Results   Component Value Date    WBC 8.20 01/29/2024    HGB 14.2 01/29/2024     01/29/2024     Lab Results   Component Value Date    INR 1.0 01/29/2024     Lab Results   Component Value Date    AST 33 01/29/2024    ALT 23 01/29/2024    BILITOT 1.5 (H) 01/29/2024    ALBUMIN 4.1 01/29/2024    ALKPHOS 82 01/29/2024    CREATININE 0.83 01/29/2024    BUN 12 01/29/2024     01/29/2024    K 3.5 01/29/2024    AFP 3.4 01/29/2024     US Abdomen Complete - 1/29/24  CLINICAL HISTORY:  Unspecified cirrhosis of liverHCV; with spleen size for portal HTN assessment;    FINDINGS:  The liver demonstrates a mildly coarse echotexture without focal lesion. Prior cholecystectomy.  The common duct measures 4 mm. The hepatic and portal veins are patent.  Portal vein velocity 23 centimeters/second.  There is no hydronephrosis.  1.5 cm cyst upper pole left kidney.  The visualized portions of the spleen and pancreas are unremarkable.  Spleen measures 10 cm in length.  The visualized portions of the abdominal aorta and IVC show no gross abnormality.    Impression  Mildly coarse hepatic echotexture, suggesting steatosis or hepatocellular disease.    ASSESSMENT        63 y.o. Black or  female with:  CHRONIC HCV, GENOTYPE 1B  -- completed 24 wks epclusa despite some dose interruptions  -- SVR labs scheduled 3/1/24    2.   CIRRHOSIS, mildly decompensated  -- MELD 9  -- HCC screening: up to date 1/2024  -- HE - on lactulose  -- EGD for EV screening:  "8/2023 - no EV      PLAN     HCV for SVR12 as scheduled 3/1/24  Continue lactulose w/ dose titration for HE symptoms. Advised to increase given recent description of "cloudy thinking." Continue xifaxan if continued problems  Next HCC screening w/ visit due  7/2024  EGD due 8/2025 as long as HCV is cured.  F/U visit in 3 months.     Cc: Hans Brock MD      __________________________________________________________________    Duration of encounter: 31 min  This includes face-to-face time and non face-to-face time preparing to see the patient (eg, review of tests), obtaining and/or reviewing separately obtained history, documenting clinical information in the electronic or other health record, independently interpreting resultsand communicating results to the patient/family/caregiver, or care coordination.            "

## 2024-02-06 ENCOUNTER — TELEPHONE (OUTPATIENT)
Dept: HEPATOLOGY | Facility: CLINIC | Age: 64
End: 2024-02-06
Payer: MEDICARE

## 2024-02-06 NOTE — TELEPHONE ENCOUNTER
----- Message from Jennifer B. Scheuermann, PA sent at 2/5/2024  4:58 PM CST -----  Pls schedule CBC, CMP, INR, AFP, U/S, HCV RNA late July w/ visit following week. x

## 2024-03-07 ENCOUNTER — TELEPHONE (OUTPATIENT)
Dept: HEPATOLOGY | Facility: CLINIC | Age: 64
End: 2024-03-07
Payer: MEDICARE

## 2024-03-07 DIAGNOSIS — Z86.19 HEPATITIS C VIRUS INFECTION CURED AFTER ANTIVIRAL DRUG THERAPY: Primary | ICD-10-CM

## 2024-03-07 PROBLEM — B18.2 CHRONIC VIRAL HEPATITIS C: Status: RESOLVED | Noted: 2023-04-27 | Resolved: 2024-03-07

## 2024-03-07 NOTE — TELEPHONE ENCOUNTER
Pt began 24 weeks of Epclusa on 5/2/23  Missed doses during Rx so SVR labs adjusted  Cee 1b  Treatment naive  F4 decomp      3/4/24 HCV neg    These labs document SVR12 following successful HCV treatment with Epclusa  This is consistent with a cure. Relapse is not expected.   No immunity is conferred and patient could be reinfected.     Pt requires lifelong liver monitoring and liver cancer screening; due again in 7/2024      Pt has been notified by [facility]    Please schedule   - HCV RNA w/ next routine labs in July

## 2024-08-08 ENCOUNTER — TELEPHONE (OUTPATIENT)
Dept: HEPATOLOGY | Facility: CLINIC | Age: 64
End: 2024-08-08
Payer: MEDICARE

## 2024-10-24 ENCOUNTER — PATIENT MESSAGE (OUTPATIENT)
Dept: HEPATOLOGY | Facility: CLINIC | Age: 64
End: 2024-10-24
Payer: MEDICARE

## 2024-10-24 ENCOUNTER — TELEPHONE (OUTPATIENT)
Dept: HEPATOLOGY | Facility: CLINIC | Age: 64
End: 2024-10-24
Payer: MEDICARE

## 2024-10-24 NOTE — TELEPHONE ENCOUNTER
Attempt made to reach patient to set lab draw.  I left a VM and sent a Donal msg asking that she call hepatology back.

## 2024-10-24 NOTE — TELEPHONE ENCOUNTER
----- Message from Jennifer Scheuermann, PA sent at 10/23/2024  9:09 PM CDT -----  Pt had u/s done but appears labs still needed. Pls schedule CBC, CMP, INR, AFP, U/S, HCV RNA.  thanks

## 2024-11-15 ENCOUNTER — PATIENT MESSAGE (OUTPATIENT)
Dept: HEPATOLOGY | Facility: CLINIC | Age: 64
End: 2024-11-15
Payer: MEDICARE

## 2024-12-16 ENCOUNTER — PATIENT MESSAGE (OUTPATIENT)
Dept: HEPATOLOGY | Facility: CLINIC | Age: 64
End: 2024-12-16
Payer: MEDICARE

## 2024-12-16 ENCOUNTER — OFFICE VISIT (OUTPATIENT)
Dept: HEPATOLOGY | Facility: CLINIC | Age: 64
End: 2024-12-16
Payer: MEDICARE

## 2024-12-16 ENCOUNTER — TELEPHONE (OUTPATIENT)
Dept: HEPATOLOGY | Facility: CLINIC | Age: 64
End: 2024-12-16
Payer: MEDICARE

## 2024-12-16 DIAGNOSIS — K76.82 HEPATIC ENCEPHALOPATHY: ICD-10-CM

## 2024-12-16 DIAGNOSIS — Z86.19 HEPATITIS C VIRUS INFECTION CURED AFTER ANTIVIRAL DRUG THERAPY: ICD-10-CM

## 2024-12-16 DIAGNOSIS — K74.60 HEPATIC CIRRHOSIS, UNSPECIFIED HEPATIC CIRRHOSIS TYPE, UNSPECIFIED WHETHER ASCITES PRESENT: Primary | ICD-10-CM

## 2024-12-16 PROCEDURE — 3051F HG A1C>EQUAL 7.0%<8.0%: CPT | Mod: CPTII,95,, | Performed by: PHYSICIAN ASSISTANT

## 2024-12-16 PROCEDURE — 1160F RVW MEDS BY RX/DR IN RCRD: CPT | Mod: CPTII,95,, | Performed by: PHYSICIAN ASSISTANT

## 2024-12-16 PROCEDURE — 4010F ACE/ARB THERAPY RXD/TAKEN: CPT | Mod: CPTII,95,, | Performed by: PHYSICIAN ASSISTANT

## 2024-12-16 PROCEDURE — 1159F MED LIST DOCD IN RCRD: CPT | Mod: CPTII,95,, | Performed by: PHYSICIAN ASSISTANT

## 2024-12-16 PROCEDURE — 99213 OFFICE O/P EST LOW 20 MIN: CPT | Mod: 95,,, | Performed by: PHYSICIAN ASSISTANT

## 2024-12-16 RX ORDER — LACTULOSE 10 G/15ML
SOLUTION ORAL
Qty: 1800 ML | Refills: 11 | Status: SHIPPED | OUTPATIENT
Start: 2024-12-16

## 2024-12-16 NOTE — PROGRESS NOTES
"HEPATOLOGY VIDEO VISIT NOTE - HCV clinic  Visit type: audiovisual    Each patient to whom he or she provides medical services by telemedicine is:  (1) informed of the relationship between the physician and patient and the respective role of any other health care provider with respect to management of the patient; and (2) notified that he or she may decline to receive medical services by telemedicine and may withdraw from such care at any time.    CHIEF COMPLAINT: Hepatitis C, Cirrhosis    HISTORY       This is a 64 y.o. Black or  female w/ HCV Cirrhosis being seen for f/u    HCV History:  Recently diagnosed  (+) HCV risk - blood transfusion 1980s  Cee 1b  Treatment naive prior to epclusa    Liver staging:  FibroSURE 3/2023 - A0-1, F3-4  Labs sx support staging    Cirrhosis history:  Mildly decompensated but stable: Mild HE, intermittent TBili elevation  No evidence portal HTN: spleen normal, plt normal  Varices: no  Variceal bleed: n/a  Variceal banding: n/a    Cirrhosis maintenance:  Varices screening - EGD 8/29/2023: no EV  HAV immunity - documented (+) HAVAB   HBV immunity - lacking, s/p 1 dose vaccine 12/2023    Interval history (2/5/24):  Completed 24 wks epclusa early December  (+) Adherence concerns: SVR labs adjusted  U/S: no liver lesions or ascites  Labs: stable. MELD 9. Normal AFP.  Current liver sx:  HE: continued "cloudy thinking" Taking lactulose QOD, ~1 BM daily  No ascites, EV bleed, TBili elevation  Twinrix dose #1 - 12/1/23    Interval history (12/16/24):  Temporarily lost to f/u due to losing disability, relocating to [place] w/ son for several months. Now back in [place] in [facility].    3/2024 HCV RNA neg: confirms HCV cure    U/S 10/2024: No liver lesions or ascites  Labs 7/2024: stable. TBili 1.6.     Current liver sx:  HE: out of lactulose. Requesting refill. No confusion. Some memory problems  No ascites, EV bleed, TBili elevation      PMH, PSH, SOCIAL HX, FAMILY HX    "   Reviewed in Epic  Pertinent findings:  FAMILY HX: neg for liver diease  SOCIAL HX: resides in [place], daughter involved in care resides in [place]  Alcohol - no heavy use  Drugs - no      ROS: as per HPI    PHYSICAL EXAM:  Friendly Black or  female, in no acute distress; alert and oriented to person, place and time  LUNGS: Normal respiratory effort.  NEURO/PSYCH: Memory grossly intact. Thought and speech pattern appropriate. Behavior normal. No depression or anxiety noted.      PERTINENT DIAGNOSTIC RESULTS      Labs 7/6/24:    WBC 8.7   RBC 4.21   Hemoglobin 12.4   Hematocrit 36.5   Platelet Count 203      5 mo ago   Sodium 135   Potassium 3.3 Low    Chloride 101   CO2 24   Anion Gap 10   Blood Urea Nitrogen 13   Creatinine 0.70   Glucose 235 High    Calcium Level Total 9.1   AST 30   ALT (SGPT) 20   Alkaline Phosphatase 59   Total Protein 7.0   Albumin 3.9   Total Bilirubin 1.6 High        Results for orders placed during the hospital encounter of 10/23/24  US Abdomen Complete  CLINICAL HISTORY:HCV; with spleen size for portal HTN assessment;  COMPARISON:Previous ultrasound from 01/29/2024    FINDINGS:  Liver exhibits a nonspecific mildly heterogeneous echotexture, similar to the previous exam.  No hepatic lesion identified.  Portal and hepatic veins are patent with appropriate flow.  Gallbladder is surgically absent.  CBD measures within normal limits at 4 mm.  Kidneys are normal in size and appearance measuring 11.0 cm on the right and 10.6 cm on the left.  Spleen is normal in size measuring 10.5 cm.  Visualized aspects of the pancreas, aorta and IVC are unremarkable.    Impression  Nonspecific mildly heterogeneous hepatic echotexture.  Prior cholecystectomy.    ASSESSMENT        64 y.o. Black or  female with:  History of HCV, treated / cured  -- s/p 24 wks epclusa, SVR12 (3/2024)    2.   Cirrhosis: mildly decompensated, stable  -- MELD 9  -- HCC screening: u/s 10/2024 ok, needs  AFP  -- HE - needs lactulose refill  -- EGD for EV screenin2023 - no EV      PLAN     Lactulose refilled  HCC screening / MELD labs every 6 months:   -- CBC, CMP, INR, AFP due now  -- due again 2025 w/ visit if all stable  HCV RNA w/ next labs  EGD due 2025. At next visit will discuss option of PCP changing metoprolol to carvedilol so future EV screening won't be needed.   Complete HBV vaccine: discuss at next visit    __________________________________________________________________    Duration of encounter: 29 min  This includes face-to-face time and non face-to-face time preparing to see the patient (eg, review of tests), obtaining and/or reviewing separately obtained history, documenting clinical information in the electronic or other health record, independently interpreting resultsand communicating results to the patient/family/caregiver, or care coordination.

## 2024-12-16 NOTE — TELEPHONE ENCOUNTER
----- Message from Jennifer Scheuermann, PA sent at 12/16/2024 11:57 AM CST -----  Schedule cbc, cmp, inr, afp, hcv rna. thx

## 2024-12-16 NOTE — TELEPHONE ENCOUNTER
Attempt made to reach patient.  Msg from PA Scheuermann left on her VM and sent to her via [employer].  Lab draw scheduled 12/18/24 since patient already has an appt that day at [facility].

## 2025-01-14 ENCOUNTER — TELEPHONE (OUTPATIENT)
Facility: CLINIC | Age: 65
End: 2025-01-14
Payer: MEDICARE

## 2025-01-14 DIAGNOSIS — K74.60 HEPATIC CIRRHOSIS, UNSPECIFIED HEPATIC CIRRHOSIS TYPE, UNSPECIFIED WHETHER ASCITES PRESENT: Primary | ICD-10-CM

## 2025-01-14 NOTE — TELEPHONE ENCOUNTER
1/10/25 labs:  CBC, INR, AFP normal  HCV neg (SVR already documented)  LFT normal  BMP stable but BUN/Cr mildly elevated 28 / 1.36    Pls call pt  Labs stable overall  Liver working well  Liver cancer screening lab looks fine  HCV still negative, as expected. We don't need to check it anymore  Kidney numbers a little elevated. Appears she may be a little dehydrated. Increase fluids- have extra bottle water daily.    Pls schedule:  CBC, CMP, INR, AFP, U/S, VISIT - 4/2025

## 2025-01-14 NOTE — TELEPHONE ENCOUNTER
I spoke with patient and msg from PA Scheuermann relayed.  Msg also left on patient's daughter (Mayra)  VM.  Testing scheduled 4/23/25 and f/u visit scheduled 5/1/25; appt reminder notices mailed.

## 2025-05-02 ENCOUNTER — TELEPHONE (OUTPATIENT)
Dept: HEPATOLOGY | Facility: CLINIC | Age: 65
End: 2025-05-02
Payer: MEDICARE

## 2025-05-02 NOTE — TELEPHONE ENCOUNTER
I spoke with patient's daughter.  Patient unable to complete hcc testing on 4/23 and virtual visit with PA Scheuermann on 5/1 because she is in a skilled facility after having surgery on her foot per daughter.  Per Mayra, patient will be in facility for a couple of weeks.  I sent a letter to the home asking that she call back for rescheduling with hepatology when convenient.